# Patient Record
Sex: FEMALE | Race: BLACK OR AFRICAN AMERICAN | Employment: FULL TIME | URBAN - METROPOLITAN AREA
[De-identification: names, ages, dates, MRNs, and addresses within clinical notes are randomized per-mention and may not be internally consistent; named-entity substitution may affect disease eponyms.]

---

## 2017-01-05 ENCOUNTER — PATIENT MESSAGE (OUTPATIENT)
Dept: INTERNAL MEDICINE CLINIC | Age: 52
End: 2017-01-05

## 2017-01-10 DIAGNOSIS — M54.41 LOW BACK PAIN WITH RIGHT-SIDED SCIATICA, UNSPECIFIED BACK PAIN LATERALITY, UNSPECIFIED CHRONICITY: ICD-10-CM

## 2017-04-03 RX ORDER — SIMVASTATIN 20 MG
TABLET ORAL
Qty: 21 TABLET | Refills: 0 | Status: SHIPPED | OUTPATIENT
Start: 2017-04-03 | End: 2017-08-04 | Stop reason: SDUPTHER

## 2017-04-24 RX ORDER — SIMVASTATIN 20 MG
TABLET ORAL
Qty: 30 TABLET | Refills: 0 | Status: SHIPPED | OUTPATIENT
Start: 2017-04-24 | End: 2017-07-16 | Stop reason: SDUPTHER

## 2017-05-25 ENCOUNTER — TELEPHONE (OUTPATIENT)
Dept: INTERNAL MEDICINE CLINIC | Age: 52
End: 2017-05-25

## 2017-05-25 DIAGNOSIS — E78.5 HYPERLIPIDEMIA, UNSPECIFIED HYPERLIPIDEMIA TYPE: Primary | ICD-10-CM

## 2017-07-11 DIAGNOSIS — E78.5 HYPERLIPIDEMIA, UNSPECIFIED HYPERLIPIDEMIA TYPE: ICD-10-CM

## 2017-07-11 LAB
ALBUMIN SERPL-MCNC: 4.7 G/DL (ref 3.4–5)
ALP BLD-CCNC: 88 U/L (ref 40–129)
ALT SERPL-CCNC: 13 U/L (ref 10–40)
AST SERPL-CCNC: 19 U/L (ref 15–37)
BILIRUB SERPL-MCNC: 0.6 MG/DL (ref 0–1)
BILIRUBIN DIRECT: <0.2 MG/DL (ref 0–0.3)
BILIRUBIN, INDIRECT: NORMAL MG/DL (ref 0–1)
CHOLESTEROL, TOTAL: 265 MG/DL (ref 0–199)
HDLC SERPL-MCNC: 86 MG/DL (ref 40–60)
LDL CHOLESTEROL CALCULATED: 162 MG/DL
TOTAL PROTEIN: 7.7 G/DL (ref 6.4–8.2)
TRIGL SERPL-MCNC: 87 MG/DL (ref 0–150)
VLDLC SERPL CALC-MCNC: 17 MG/DL

## 2017-07-17 RX ORDER — SIMVASTATIN 20 MG
TABLET ORAL
Qty: 14 TABLET | Refills: 0 | Status: SHIPPED | OUTPATIENT
Start: 2017-07-17 | End: 2017-08-04 | Stop reason: SDUPTHER

## 2017-08-04 ENCOUNTER — OFFICE VISIT (OUTPATIENT)
Dept: INTERNAL MEDICINE CLINIC | Age: 52
End: 2017-08-04

## 2017-08-04 VITALS
HEART RATE: 79 BPM | OXYGEN SATURATION: 96 % | BODY MASS INDEX: 25.32 KG/M2 | TEMPERATURE: 97.9 F | SYSTOLIC BLOOD PRESSURE: 138 MMHG | WEIGHT: 137.6 LBS | HEIGHT: 62 IN | DIASTOLIC BLOOD PRESSURE: 78 MMHG | RESPIRATION RATE: 12 BRPM

## 2017-08-04 DIAGNOSIS — G47.00 INSOMNIA, UNSPECIFIED TYPE: ICD-10-CM

## 2017-08-04 DIAGNOSIS — K21.9 GASTROESOPHAGEAL REFLUX DISEASE, ESOPHAGITIS PRESENCE NOT SPECIFIED: ICD-10-CM

## 2017-08-04 DIAGNOSIS — E78.5 HYPERLIPIDEMIA, UNSPECIFIED HYPERLIPIDEMIA TYPE: Primary | ICD-10-CM

## 2017-08-04 PROCEDURE — 99214 OFFICE O/P EST MOD 30 MIN: CPT | Performed by: INTERNAL MEDICINE

## 2017-08-04 RX ORDER — CLONAZEPAM 1 MG/1
1 TABLET ORAL NIGHTLY PRN
Qty: 30 TABLET | Refills: 0 | Status: SHIPPED | OUTPATIENT
Start: 2017-08-04 | End: 2017-11-16 | Stop reason: SDUPTHER

## 2017-08-04 RX ORDER — OMEPRAZOLE 20 MG/1
20 CAPSULE, DELAYED RELEASE ORAL DAILY
Qty: 30 CAPSULE | Refills: 3 | Status: SHIPPED | OUTPATIENT
Start: 2017-08-04 | End: 2017-11-16 | Stop reason: SDUPTHER

## 2017-08-04 RX ORDER — SIMVASTATIN 20 MG
TABLET ORAL
Qty: 90 TABLET | Refills: 1 | Status: SHIPPED | OUTPATIENT
Start: 2017-08-04 | End: 2018-03-01 | Stop reason: SDUPTHER

## 2017-08-07 ASSESSMENT — ENCOUNTER SYMPTOMS
WHEEZING: 0
CHEST TIGHTNESS: 0
NAUSEA: 0
COUGH: 0
DIARRHEA: 0
ABDOMINAL PAIN: 0
BLOOD IN STOOL: 0
SINUS PRESSURE: 0
TROUBLE SWALLOWING: 0
VOMITING: 0
SHORTNESS OF BREATH: 0
CONSTIPATION: 0
RHINORRHEA: 0
SORE THROAT: 0

## 2017-10-31 ENCOUNTER — TELEPHONE (OUTPATIENT)
Dept: INTERNAL MEDICINE CLINIC | Age: 52
End: 2017-10-31

## 2017-10-31 RX ORDER — VALACYCLOVIR HYDROCHLORIDE 500 MG/1
500 TABLET, FILM COATED ORAL 2 TIMES DAILY
Qty: 10 TABLET | Refills: 0 | Status: SHIPPED | OUTPATIENT
Start: 2017-10-31 | End: 2017-12-14 | Stop reason: SDUPTHER

## 2017-10-31 NOTE — TELEPHONE ENCOUNTER
Patient asking for refill on valtrex  -any question call  [phone] --pharm number [phone] -patient leaving town tomorrow at 7.00 am --

## 2017-10-31 NOTE — TELEPHONE ENCOUNTER
Left message on pt's voicemail to verify dosage she takes since I have not filled it for her before. Pt called back and left message that she takes 500mg twice daily for 5 days. Rx refilled.

## 2017-11-16 ENCOUNTER — OFFICE VISIT (OUTPATIENT)
Dept: INTERNAL MEDICINE CLINIC | Age: 52
End: 2017-11-16

## 2017-11-16 VITALS
WEIGHT: 137.4 LBS | RESPIRATION RATE: 14 BRPM | BODY MASS INDEX: 25.94 KG/M2 | HEART RATE: 81 BPM | SYSTOLIC BLOOD PRESSURE: 124 MMHG | HEIGHT: 61 IN | OXYGEN SATURATION: 97 % | DIASTOLIC BLOOD PRESSURE: 84 MMHG | TEMPERATURE: 97.9 F

## 2017-11-16 DIAGNOSIS — E78.2 MIXED HYPERLIPIDEMIA: ICD-10-CM

## 2017-11-16 DIAGNOSIS — Z11.59 NEED FOR HEPATITIS C SCREENING TEST: ICD-10-CM

## 2017-11-16 DIAGNOSIS — K63.5 POLYP OF COLON, UNSPECIFIED PART OF COLON, UNSPECIFIED TYPE: ICD-10-CM

## 2017-11-16 DIAGNOSIS — K21.9 LARYNGOPHARYNGEAL REFLUX: ICD-10-CM

## 2017-11-16 DIAGNOSIS — Z11.4 SCREENING FOR HIV WITHOUT PRESENCE OF RISK FACTORS: ICD-10-CM

## 2017-11-16 DIAGNOSIS — Z00.00 ANNUAL PHYSICAL EXAM: ICD-10-CM

## 2017-11-16 DIAGNOSIS — R20.2 PARESTHESIAS: ICD-10-CM

## 2017-11-16 DIAGNOSIS — F51.01 PRIMARY INSOMNIA: ICD-10-CM

## 2017-11-16 DIAGNOSIS — Z00.00 ANNUAL PHYSICAL EXAM: Primary | ICD-10-CM

## 2017-11-16 DIAGNOSIS — A60.00 GENITAL HERPES SIMPLEX, UNSPECIFIED SITE: ICD-10-CM

## 2017-11-16 DIAGNOSIS — J39.2 SPASM OF THROAT: ICD-10-CM

## 2017-11-16 LAB
A/G RATIO: 1.7 (ref 1.1–2.2)
ALBUMIN SERPL-MCNC: 4.5 G/DL (ref 3.4–5)
ALP BLD-CCNC: 81 U/L (ref 40–129)
ALT SERPL-CCNC: 10 U/L (ref 10–40)
ANION GAP SERPL CALCULATED.3IONS-SCNC: 19 MMOL/L (ref 3–16)
AST SERPL-CCNC: 20 U/L (ref 15–37)
BASOPHILS ABSOLUTE: 0 K/UL (ref 0–0.2)
BASOPHILS RELATIVE PERCENT: 0.9 %
BILIRUB SERPL-MCNC: 0.8 MG/DL (ref 0–1)
BILIRUBIN, POC: NORMAL
BLOOD URINE, POC: NORMAL
BUN BLDV-MCNC: 19 MG/DL (ref 7–20)
CALCIUM SERPL-MCNC: 9.7 MG/DL (ref 8.3–10.6)
CHLORIDE BLD-SCNC: 106 MMOL/L (ref 99–110)
CHOLESTEROL, TOTAL: 253 MG/DL (ref 0–199)
CLARITY, POC: CLEAR
CO2: 22 MMOL/L (ref 21–32)
COLOR, POC: NORMAL
CREAT SERPL-MCNC: 0.9 MG/DL (ref 0.6–1.1)
EOSINOPHILS ABSOLUTE: 0.1 K/UL (ref 0–0.6)
EOSINOPHILS RELATIVE PERCENT: 1.6 %
GFR AFRICAN AMERICAN: >60
GFR NON-AFRICAN AMERICAN: >60
GLOBULIN: 2.7 G/DL
GLUCOSE BLD-MCNC: 92 MG/DL (ref 70–99)
GLUCOSE URINE, POC: NORMAL
HCT VFR BLD CALC: 36.6 % (ref 36–48)
HDLC SERPL-MCNC: 88 MG/DL (ref 40–60)
HEMOGLOBIN: 12 G/DL (ref 12–16)
HEPATITIS C ANTIBODY INTERPRETATION: NORMAL
KETONES, POC: NORMAL
LDL CHOLESTEROL CALCULATED: 147 MG/DL
LEUKOCYTE EST, POC: NORMAL
LYMPHOCYTES ABSOLUTE: 1.6 K/UL (ref 1–5.1)
LYMPHOCYTES RELATIVE PERCENT: 40.7 %
MCH RBC QN AUTO: 30.7 PG (ref 26–34)
MCHC RBC AUTO-ENTMCNC: 32.8 G/DL (ref 31–36)
MCV RBC AUTO: 93.5 FL (ref 80–100)
MONOCYTES ABSOLUTE: 0.3 K/UL (ref 0–1.3)
MONOCYTES RELATIVE PERCENT: 6.8 %
NEUTROPHILS ABSOLUTE: 2 K/UL (ref 1.7–7.7)
NEUTROPHILS RELATIVE PERCENT: 50 %
NITRITE, POC: NORMAL
PDW BLD-RTO: 13.3 % (ref 12.4–15.4)
PH, POC: 5.5
PLATELET # BLD: 213 K/UL (ref 135–450)
PMV BLD AUTO: 9.6 FL (ref 5–10.5)
POTASSIUM SERPL-SCNC: 4 MMOL/L (ref 3.5–5.1)
PROTEIN, POC: NORMAL
RBC # BLD: 3.91 M/UL (ref 4–5.2)
SODIUM BLD-SCNC: 147 MMOL/L (ref 136–145)
SPECIFIC GRAVITY, POC: 1.01
TOTAL PROTEIN: 7.2 G/DL (ref 6.4–8.2)
TRIGL SERPL-MCNC: 92 MG/DL (ref 0–150)
UROBILINOGEN, POC: 0.2
VITAMIN B-12: 652 PG/ML (ref 211–911)
VLDLC SERPL CALC-MCNC: 18 MG/DL
WBC # BLD: 4 K/UL (ref 4–11)

## 2017-11-16 PROCEDURE — 99396 PREV VISIT EST AGE 40-64: CPT | Performed by: INTERNAL MEDICINE

## 2017-11-16 PROCEDURE — 81002 URINALYSIS NONAUTO W/O SCOPE: CPT | Performed by: INTERNAL MEDICINE

## 2017-11-16 RX ORDER — OMEPRAZOLE 20 MG/1
20 CAPSULE, DELAYED RELEASE ORAL DAILY
Qty: 30 CAPSULE | Refills: 5 | Status: SHIPPED | OUTPATIENT
Start: 2017-11-16 | End: 2018-12-31 | Stop reason: ALTCHOICE

## 2017-11-16 RX ORDER — ACYCLOVIR 50 MG/G
OINTMENT TOPICAL
Qty: 15 G | Refills: 2 | Status: SHIPPED | OUTPATIENT
Start: 2017-11-16 | End: 2020-08-28

## 2017-11-16 RX ORDER — CLONAZEPAM 1 MG/1
1 TABLET ORAL NIGHTLY PRN
Qty: 30 TABLET | Refills: 1 | Status: SHIPPED | OUTPATIENT
Start: 2017-11-16 | End: 2018-03-07 | Stop reason: SDUPTHER

## 2017-11-16 ASSESSMENT — ENCOUNTER SYMPTOMS
EYE PAIN: 0
PHOTOPHOBIA: 0
SINUS PRESSURE: 0
RHINORRHEA: 0
SHORTNESS OF BREATH: 0
RECTAL PAIN: 0
COUGH: 0
ABDOMINAL DISTENTION: 0
CHOKING: 0
SORE THROAT: 0
NAUSEA: 0
CONSTIPATION: 0
WHEEZING: 0
BLOOD IN STOOL: 0
EYE DISCHARGE: 0
VOMITING: 0
BACK PAIN: 1
EYE ITCHING: 0
EYE REDNESS: 0
VOICE CHANGE: 0
ANAL BLEEDING: 0
APNEA: 0
CHEST TIGHTNESS: 0
TROUBLE SWALLOWING: 0
ABDOMINAL PAIN: 0
DIARRHEA: 0
FACIAL SWELLING: 0

## 2017-11-16 ASSESSMENT — PATIENT HEALTH QUESTIONNAIRE - PHQ9
2. FEELING DOWN, DEPRESSED OR HOPELESS: 0
1. LITTLE INTEREST OR PLEASURE IN DOING THINGS: 0
SUM OF ALL RESPONSES TO PHQ9 QUESTIONS 1 & 2: 0
SUM OF ALL RESPONSES TO PHQ QUESTIONS 1-9: 0

## 2017-11-16 NOTE — PROGRESS NOTES
Trecia Dance   YOB: 1965    Date of Visit:  11/16/2017    Chief Complaint   Patient presents with    Annual Exam     fasting        HPI  Pt presents for annual physical exam. Pap smear is scheduled for 11/20/17. LMP > 1 year ago. Pt has insomnia. Pt takes Melatonin and Klonopin. Pt has laryngeal reflux stable    Tolerating Simvastatin. Does yoga once a week helps back pain. Pt didn't see Ortho  Low back pain flare up once every 2 weeks doesn't take anything. Constant dull achy can be sharp  Rads to right leg once every 2 weeks but tolerable   Does back exercises    Numbness both feet x 1 year       Genital herpes twice a year  Spasm in throat at least once a month has to take a swallow if talking and it happens  Denies difficulty swallowing  Sometimes just comes on feels like internal pulsation  Can last off and on for 6- 12 hours      Review of Systems   Constitutional: Negative for activity change, appetite change, chills, diaphoresis, fatigue, fever and unexpected weight change. HENT: Negative for congestion, ear discharge, ear pain, facial swelling, hearing loss, mouth sores, nosebleeds, postnasal drip, rhinorrhea, sinus pressure, sneezing, sore throat, tinnitus, trouble swallowing and voice change. Eyes: Negative for photophobia, pain, discharge, redness, itching and visual disturbance. Respiratory: Negative for apnea, cough, choking, chest tightness, shortness of breath and wheezing. Cardiovascular: Negative for chest pain, palpitations and leg swelling. Gastrointestinal: Negative for abdominal distention, abdominal pain, anal bleeding, blood in stool, constipation, diarrhea, nausea, rectal pain and vomiting. Endocrine: Negative for cold intolerance and heat intolerance.    Genitourinary: Negative for decreased urine volume, difficulty urinating, dysuria, flank pain, frequency, genital sores, hematuria, menstrual problem, pelvic pain, urgency, vaginal bleeding, vaginal discharge and vaginal pain. Musculoskeletal: Positive for back pain. Negative for arthralgias, gait problem, joint swelling, myalgias, neck pain and neck stiffness. Skin: Negative for rash. Neurological: Positive for numbness. Negative for dizziness, tremors, seizures, syncope, facial asymmetry, speech difficulty, weakness, light-headedness and headaches. Hematological: Negative for adenopathy. Bruises/bleeds easily (bruises easily). Psychiatric/Behavioral: Positive for sleep disturbance. Negative for decreased concentration and dysphoric mood. The patient is not nervous/anxious. All other systems reviewed and are negative.       Past Medical History:   Diagnosis Date    Acid reflux     Allergic rhinitis     Anxiety     Glaucoma     bilateral    Hyperlipidemia     borderline     Insomnia        Past Surgical History:   Procedure Laterality Date    BREAST BIOPSY Right 03/31/2016    BENIGN    BUNIONECTOMY Bilateral 2012, 2013    COLONOSCOPY  01/30/2014    [facility] TUBAL LIGATION  2001    WISDOM TOOTH EXTRACTION Bilateral teenager    x 4       Outpatient Prescriptions Marked as Taking for the 11/16/17 encounter (Office Visit) with Zi Chavez MD   Medication Sig Dispense Refill    simvastatin (ZOCOR) 20 MG tablet TAKE 1 TABLET BY MOUTH EVERY EVENING 90 tablet 1    clonazePAM (KLONOPIN) 1 MG tablet Take 1 tablet by mouth nightly as needed (insomnia) 30 tablet 0    omeprazole (PRILOSEC) 20 MG delayed release capsule Take 1 capsule by mouth daily 30 capsule 3    latanoprost (XALATAN) 0.005 % ophthalmic solution Place 1 drop into both eyes nightly   0    Multiple Vitamins-Minerals (THERAPEUTIC MULTIVITAMIN-MINERALS) tablet Take 1 tablet by mouth daily      Biotin 5000 MCG CAPS Take 1 capsule by mouth daily      Cyanocobalamin (VITAMIN B 12 PO) Take 1 tablet by mouth daily      Melatonin 2.5 MG CAPS Take 1 capsule by mouth nightly as needed      are normal. She exhibits no distension and no mass. There is no hepatosplenomegaly. There is no tenderness. There is no rebound. Genitourinary:   Genitourinary Comments: Deferred   Musculoskeletal: Normal range of motion. She exhibits no edema. Right shoulder: She exhibits normal range of motion and no tenderness. Left shoulder: She exhibits normal range of motion and no tenderness. Right elbow: She exhibits no swelling. No tenderness found. Left elbow: She exhibits no swelling. No tenderness found. Right wrist: She exhibits no tenderness and no swelling. Left wrist: She exhibits no tenderness and no swelling. Right hip: She exhibits no tenderness. Left hip: She exhibits no tenderness. Right knee: She exhibits no swelling. No tenderness found. Left knee: She exhibits no swelling. No tenderness found. Right ankle: She exhibits no swelling. No tenderness. Left ankle: She exhibits no swelling. No tenderness. Cervical back: She exhibits normal range of motion, no tenderness and no spasm. Thoracic back: She exhibits no tenderness and no spasm. Lumbar back: She exhibits normal range of motion, no tenderness and no spasm. Right upper arm: She exhibits no tenderness. Left upper arm: She exhibits no tenderness. Right hand: She exhibits no tenderness and no swelling. Left hand: She exhibits no tenderness and no swelling. Right upper leg: She exhibits no tenderness. Left upper leg: She exhibits no tenderness. Right lower leg: She exhibits no tenderness. Left lower leg: She exhibits no tenderness. Right foot: There is no tenderness and no swelling. Left foot: There is no tenderness and no swelling. Lymphadenopathy:        Head (right side): No submandibular adenopathy present. Head (left side): No submandibular adenopathy present.      She has no cervical adenopathy. She has no axillary adenopathy. Neurological: She is alert and oriented to person, place, and time. She has normal strength and normal reflexes. No cranial nerve deficit. Gait normal.   Skin: Skin is warm, dry and intact. No bruising, no lesion and no rash noted. No erythema. Psychiatric: She has a normal mood and affect. Her speech is normal.       Results for POC orders placed in visit on 11/16/17   POCT Urinalysis no Micro   Result Value Ref Range    Color, UA pale     Clarity, UA clear     Glucose, UA POC neg     Bilirubin, UA neg     Ketones, UA neg     Spec Grav, UA 1.015     Blood, UA POC trace-intact     pH, UA 5.5     Protein, UA POC neg     Urobilinogen, UA 0.2     Leukocytes, UA neg     Nitrite, UA neg        Assessment/Plan     1. Annual physical exam    - POCT Urinalysis no Micro  - CBC Auto Differential; Future  - Comprehensive Metabolic Panel; Future  - Lipid Panel; Future  - TSH without Reflex; Future    2. Primary insomnia  -stable  - clonazePAM (KLONOPIN) 1 MG tablet; Take 1 tablet by mouth nightly as needed (insomnia) . Dispense: 30 tablet; Refill: 1    3. Mixed hyperlipidemia  -Continue same medications  -Low fat, low cholesterol diet  -Regular aerobic exercise    4. Laryngopharyngeal reflux  - omeprazole (PRILOSEC) 20 MG delayed release capsule; Take 1 capsule by mouth daily  Dispense: 30 capsule; Refill: 5    5. Polyp of colon, unspecified part of colon, unspecified type    - [facility] - Ashok Desai MD ([facility])    6. Genital herpes simplex, unspecified site    - acyclovir (ZOVIRAX) 5 % ointment; Apply topically every 3 hours for 7 days with outbreaks  Dispense: 15 g; Refill: 2    7. Paresthesias    - EMG; Future  - Vitamin B12; Future    8. Screening for HIV without presence of risk factors    - HIV Screen; Future    9. Need for hepatitis C screening test    - Hepatitis C Antibody; Future    10.  Spasm of throat    - [place],

## 2017-11-16 NOTE — PATIENT INSTRUCTIONS
early.  · Cholesterol. Your doctor will tell you how often to have this done based on your age, family history, or other things that can increase your risk for heart attack and stroke. · Blood pressure. Have your blood pressure checked during a routine doctor visit. Your doctor will tell you how often to check your blood pressure based on your age, your blood pressure results, and other factors. · Mammogram. Ask your doctor how often you should have a mammogram, which is an X-ray of your breasts. A mammogram can spot breast cancer before it can be felt and when it is easiest to treat. · Pap test and pelvic exam. Ask your doctor how often you should have a Pap test. You may not need to have a Pap test as often as you used to. · Vision. Have your eyes checked every year or two or as often as your doctor suggests. Some experts recommend that you have yearly exams for glaucoma and other age-related eye problems starting at age 48. · Hearing. Tell your doctor if you notice any change in your hearing. You can have tests to find out how well you hear. · Diabetes. Ask your doctor whether you should have tests for diabetes. · Colon cancer. You should begin tests for colon cancer at age 48. You may have one of several tests. Your doctor will tell you how often to have tests based on your age and risk. Risks include whether you already had a precancerous polyp removed from your colon or whether your parents, sisters and brothers, or children have had colon cancer. · Thyroid disease. Talk to your doctor about whether to have your thyroid checked as part of a regular physical exam. Women have an increased chance of a thyroid problem. · Osteoporosis. You should begin tests for bone density at age 72. If you are younger than 72, ask your doctor whether you have factors that may increase your risk for this disease. You may want to have this test before age 72. · Heart attack and stroke risk.  At least every 4 to 6 years, you should have your risk for heart attack and stroke assessed. Your doctor uses factors such as your age, blood pressure, cholesterol, and whether you smoke or have diabetes to show what your risk for a heart attack or stroke is over the next 10 years. When should you call for help? Watch closely for changes in your health, and be sure to contact your doctor if you have any problems or symptoms that concern you. Where can you learn more? Go to https://[employer]. org and sign in to your [employer] account. Enter U002 in the [employer] box to learn more about \"Well Visit, Women 50 to 72: Care Instructions. \"     If you do not have an account, please click on the \"Sign Up Now\" link. Current as of: July 19, 2016  Content Version: 11.3  © 2880-2342 [employer]. Care instructions adapted under license by [facility] ([facility]). If you have questions about a medical condition or this instruction, always ask your healthcare professional. [place] [address] any warranty or liability for your use of this information.

## 2017-11-17 LAB
HIV-1 AND HIV-2 ANTIBODIES: NORMAL
TSH SERPL DL<=0.05 MIU/L-ACNC: 1.46 UIU/ML (ref 0.27–4.2)

## 2017-11-20 ENCOUNTER — PATIENT MESSAGE (OUTPATIENT)
Dept: INTERNAL MEDICINE CLINIC | Age: 52
End: 2017-11-20

## 2017-11-21 NOTE — TELEPHONE ENCOUNTER
From: Philippe Rider  To: Steven Buck MD  Sent: 11/20/2017 8:23 PM EST  Subject: Test Results Question    Hello,    Thank you for results. I did not see the HIV results. Also, I did not see the Metabolic Test results. I was at the high end of most , sodium, glucose in 2016. Wondering what my #s are now. Thank you.   Marcelina

## 2017-11-22 DIAGNOSIS — E87.29 METABOLIC ACIDOSIS, INCREASED ANION GAP: ICD-10-CM

## 2017-11-22 DIAGNOSIS — E87.0 SERUM SODIUM ELEVATED: Primary | ICD-10-CM

## 2017-12-06 ENCOUNTER — TELEPHONE (OUTPATIENT)
Dept: INTERNAL MEDICINE CLINIC | Age: 52
End: 2017-12-06

## 2017-12-06 NOTE — TELEPHONE ENCOUNTER
Received notice that patient hasn't read my [employer] message. Print message regarding results and mail to patient.

## 2017-12-15 RX ORDER — VALACYCLOVIR HYDROCHLORIDE 500 MG/1
TABLET, FILM COATED ORAL
Qty: 10 TABLET | Refills: 3 | Status: SHIPPED | OUTPATIENT
Start: 2017-12-15 | End: 2019-04-02 | Stop reason: SDUPTHER

## 2018-02-27 ENCOUNTER — OFFICE VISIT (OUTPATIENT)
Dept: INTERNAL MEDICINE CLINIC | Age: 53
End: 2018-02-27

## 2018-02-27 VITALS
SYSTOLIC BLOOD PRESSURE: 152 MMHG | HEART RATE: 76 BPM | HEIGHT: 61 IN | OXYGEN SATURATION: 98 % | DIASTOLIC BLOOD PRESSURE: 100 MMHG | BODY MASS INDEX: 26.24 KG/M2 | WEIGHT: 139 LBS

## 2018-02-27 DIAGNOSIS — F51.01 PRIMARY INSOMNIA: ICD-10-CM

## 2018-02-27 DIAGNOSIS — R42 LIGHTHEADEDNESS: ICD-10-CM

## 2018-02-27 DIAGNOSIS — Z13.6 SCREENING FOR ISCHEMIC HEART DISEASE (IHD): ICD-10-CM

## 2018-02-27 DIAGNOSIS — I10 HYPERTENSION, UNSPECIFIED TYPE: ICD-10-CM

## 2018-02-27 DIAGNOSIS — R55 SYNCOPE, UNSPECIFIED SYNCOPE TYPE: ICD-10-CM

## 2018-02-27 DIAGNOSIS — E78.2 MIXED HYPERLIPIDEMIA: ICD-10-CM

## 2018-02-27 DIAGNOSIS — I10 HYPERTENSION, UNSPECIFIED TYPE: Primary | ICD-10-CM

## 2018-02-27 LAB
A/G RATIO: 1.6 (ref 1.1–2.2)
ALBUMIN SERPL-MCNC: 4.5 G/DL (ref 3.4–5)
ALP BLD-CCNC: 86 U/L (ref 40–129)
ALT SERPL-CCNC: 12 U/L (ref 10–40)
ANION GAP SERPL CALCULATED.3IONS-SCNC: 15 MMOL/L (ref 3–16)
AST SERPL-CCNC: 20 U/L (ref 15–37)
BILIRUB SERPL-MCNC: 0.5 MG/DL (ref 0–1)
BUN BLDV-MCNC: 13 MG/DL (ref 7–20)
CALCIUM SERPL-MCNC: 9.3 MG/DL (ref 8.3–10.6)
CHLORIDE BLD-SCNC: 107 MMOL/L (ref 99–110)
CHOLESTEROL, TOTAL: 231 MG/DL (ref 0–199)
CO2: 23 MMOL/L (ref 21–32)
CREAT SERPL-MCNC: 0.6 MG/DL (ref 0.6–1.1)
GFR AFRICAN AMERICAN: >60
GFR NON-AFRICAN AMERICAN: >60
GLOBULIN: 2.8 G/DL
GLUCOSE BLD-MCNC: 109 MG/DL (ref 70–99)
HCT VFR BLD CALC: 35.4 % (ref 36–48)
HDLC SERPL-MCNC: 69 MG/DL (ref 40–60)
HEMOGLOBIN: 11.6 G/DL (ref 12–16)
LDL CHOLESTEROL CALCULATED: 132 MG/DL
MCH RBC QN AUTO: 30.6 PG (ref 26–34)
MCHC RBC AUTO-ENTMCNC: 32.8 G/DL (ref 31–36)
MCV RBC AUTO: 93.3 FL (ref 80–100)
PDW BLD-RTO: 12.9 % (ref 12.4–15.4)
PLATELET # BLD: 286 K/UL (ref 135–450)
PMV BLD AUTO: 9.1 FL (ref 5–10.5)
POTASSIUM SERPL-SCNC: 4 MMOL/L (ref 3.5–5.1)
RBC # BLD: 3.79 M/UL (ref 4–5.2)
SODIUM BLD-SCNC: 145 MMOL/L (ref 136–145)
TOTAL PROTEIN: 7.3 G/DL (ref 6.4–8.2)
TRIGL SERPL-MCNC: 148 MG/DL (ref 0–150)
TSH REFLEX FT4: 1.63 UIU/ML (ref 0.27–4.2)
VLDLC SERPL CALC-MCNC: 30 MG/DL
WBC # BLD: 4.8 K/UL (ref 4–11)

## 2018-02-27 PROCEDURE — 93000 ELECTROCARDIOGRAM COMPLETE: CPT | Performed by: NURSE PRACTITIONER

## 2018-02-27 PROCEDURE — 99214 OFFICE O/P EST MOD 30 MIN: CPT | Performed by: NURSE PRACTITIONER

## 2018-02-27 RX ORDER — AMLODIPINE BESYLATE AND BENAZEPRIL HYDROCHLORIDE 2.5; 1 MG/1; MG/1
1 CAPSULE ORAL DAILY
Qty: 30 CAPSULE | Refills: 1 | Status: SHIPPED | OUTPATIENT
Start: 2018-02-27 | End: 2018-03-27 | Stop reason: SDUPTHER

## 2018-02-27 ASSESSMENT — ENCOUNTER SYMPTOMS
CHEST TIGHTNESS: 0
SHORTNESS OF BREATH: 0

## 2018-02-27 ASSESSMENT — PATIENT HEALTH QUESTIONNAIRE - PHQ9
2. FEELING DOWN, DEPRESSED OR HOPELESS: 0
SUM OF ALL RESPONSES TO PHQ QUESTIONS 1-9: 0
1. LITTLE INTEREST OR PLEASURE IN DOING THINGS: 0
SUM OF ALL RESPONSES TO PHQ9 QUESTIONS 1 & 2: 0

## 2018-02-27 NOTE — PROGRESS NOTES
respiratory distress. Abdominal: Soft. Bowel sounds are normal.   Musculoskeletal: She exhibits no edema. Neurological: She is alert and oriented to person, place, and time. She has normal strength. No cranial nerve deficit or sensory deficit. Reflex Scores:       Tricep reflexes are 2+ on the right side and 2+ on the left side. Bicep reflexes are 2+ on the right side and 2+ on the left side. Brachioradialis reflexes are 2+ on the right side and 2+ on the left side. Patellar reflexes are 2+ on the right side and 2+ on the left side. Achilles reflexes are 2+ on the right side and 2+ on the left side. Skin: Skin is warm and dry. Psychiatric: She has a normal mood and affect. Her speech is normal and behavior is normal. Judgment and thought content normal. Cognition and memory are normal.   Nursing note and vitals reviewed. Assessment/Plan     EKG obtained and reviewed. Short OK once again noted. No ischemia. RSR V2 lead. 1. Hypertension, unspecified type  Likely benign HTN, new onset. Start Lotrel low dose. Home BP monitoring. Please remember to keep track of your blood pressure. Ideally, the top number (systolic) should be under 130 and the bottom number (diastolic) should be under 80. If the top is consistently over 140 or the bottom over 90, we may need to adjust your medication. Watch the salt in your diet -- too much can make blood pressure worse. We will lower this gradually. Avoid exerting yourself if you are experiencing lightheadedness or dizziness. Follow up with Cardiology and then Dr. Latisha Kraft MD  - amLODIPine-benazepril (LOTREL) 2.5-10 MG per capsule; Take 1 capsule by mouth daily  Dispense: 30 capsule; Refill: 1  - TSH WITH REFLEX TO FT4; Future  - Comprehensive Metabolic Panel; Future    2. Syncope, unspecified syncope type    - EKG 12 Lead  - Rena Rasmussen MD  - CBC;  Future  - TSH WITH REFLEX TO FT4; Future  - Comprehensive Metabolic Panel; Future    3. Lightheadedness    - EKG 12 Lead  - Rena Blue MD  - CBC; Future  - TSH WITH REFLEX TO FT4; Future  - Comprehensive Metabolic Panel; Future    4. Screening for ischemic heart disease (IHD)    - EKG 12 Lead    5. Mixed hyperlipidemia  Was not at goal taking medication sporadically. Will check lipids on med as prescribed. - Rod Abarca MD  - Lipid Panel; Future  - Comprehensive Metabolic Panel; Future    6. Primary insomnia  Stable  Dr. Zhen Gann will RF Klonopin. Discussed medications with patient, who voiced understanding of their use and indications. All questions answered. Pt is advised to call if symptoms worsen or do not improve.

## 2018-03-01 DIAGNOSIS — E78.5 HYPERLIPIDEMIA, UNSPECIFIED HYPERLIPIDEMIA TYPE: ICD-10-CM

## 2018-03-01 RX ORDER — SIMVASTATIN 20 MG
TABLET ORAL
Qty: 90 TABLET | Refills: 0 | Status: SHIPPED | OUTPATIENT
Start: 2018-03-01 | End: 2018-03-07

## 2018-03-02 DIAGNOSIS — G47.00 INSOMNIA, UNSPECIFIED TYPE: ICD-10-CM

## 2018-03-02 RX ORDER — SIMVASTATIN 20 MG
TABLET ORAL
Qty: 14 TABLET | Refills: 0 | OUTPATIENT
Start: 2018-03-02

## 2018-03-02 RX ORDER — SIMVASTATIN 20 MG
TABLET ORAL
Qty: 21 TABLET | Refills: 0 | OUTPATIENT
Start: 2018-03-02

## 2018-03-02 RX ORDER — CLONAZEPAM 1 MG/1
TABLET ORAL
Qty: 30 TABLET | Refills: 1 | Status: SHIPPED | OUTPATIENT
Start: 2018-03-02 | End: 2018-07-20 | Stop reason: SDUPTHER

## 2018-03-07 ENCOUNTER — OFFICE VISIT (OUTPATIENT)
Dept: CARDIOLOGY CLINIC | Age: 53
End: 2018-03-07

## 2018-03-07 VITALS
DIASTOLIC BLOOD PRESSURE: 82 MMHG | WEIGHT: 136.2 LBS | HEART RATE: 76 BPM | SYSTOLIC BLOOD PRESSURE: 110 MMHG | BODY MASS INDEX: 25.73 KG/M2

## 2018-03-07 DIAGNOSIS — R07.89 ATYPICAL CHEST PAIN: ICD-10-CM

## 2018-03-07 DIAGNOSIS — R06.02 SHORTNESS OF BREATH: Primary | ICD-10-CM

## 2018-03-07 DIAGNOSIS — E78.00 PURE HYPERCHOLESTEROLEMIA: ICD-10-CM

## 2018-03-07 PROCEDURE — 99214 OFFICE O/P EST MOD 30 MIN: CPT | Performed by: INTERNAL MEDICINE

## 2018-03-07 RX ORDER — WARFARIN SODIUM 5 MG/1
5 TABLET ORAL
COMMUNITY
End: 2018-03-07 | Stop reason: CLARIF

## 2018-03-07 NOTE — PROGRESS NOTES
Cruz Wilkins, NP   valACYclovir (VALTREX) 500 MG tablet TAKE 1 TABLET BY MOUTH TWICE DAILY FOR 5 DAYS Yes You Meng MD   acyclovir (ZOVIRAX) 5 % ointment Apply topically every 3 hours for 7 days with outbreaks Yes You Meng MD   omeprazole (PRILOSEC) 20 MG delayed release capsule Take 1 capsule by mouth daily Yes You Meng MD   latanoprost (XALATAN) 0.005 % ophthalmic solution Place 1 drop into both eyes nightly  Yes Historical Provider, MD   Multiple Vitamins-Minerals (THERAPEUTIC MULTIVITAMIN-MINERALS) tablet Take 1 tablet by mouth daily Yes Historical Provider, MD   Biotin 5000 MCG CAPS Take 1 capsule by mouth daily Yes Historical Provider, MD   Cyanocobalamin (VITAMIN B 12 PO) Take 1 tablet by mouth daily Yes Historical Provider, MD   Melatonin 2.5 MG CAPS Take 1 capsule by mouth nightly as needed Yes Historical Provider, MD     Family History  Family History   Problem Relation Age of Onset    Diabetes Mother     High Blood Pressure Mother     Cancer Mother      endometrial    Heart Disease Maternal Grandmother     High Blood Pressure Maternal Grandmother     Stroke Maternal Grandmother     Cancer Maternal Grandfather      throat       Current Medications  Current Outpatient Prescriptions   Medication Sig Dispense Refill    warfarin (COUMADIN) 5 MG tablet Take 5 mg by mouth Managed by Dr Lorena Shook Cornelia      clonazePAM (KLONOPIN) 1 MG tablet TAKE 1 TABLET BY MOUTH EVERY NIGHT AS NEEDED FOR INSOMNIA 30 tablet 1    simvastatin (ZOCOR) 20 MG tablet TAKE 1 TABLET BY MOUTH EVERY EVENING 90 tablet 0    amLODIPine-benazepril (LOTREL) 2.5-10 MG per capsule Take 1 capsule by mouth daily 30 capsule 1    valACYclovir (VALTREX) 500 MG tablet TAKE 1 TABLET BY MOUTH TWICE DAILY FOR 5 DAYS 10 tablet 3    acyclovir (ZOVIRAX) 5 % ointment Apply topically every 3 hours for 7 days with outbreaks 15 g 2    omeprazole (PRILOSEC) 20 MG delayed release capsule Take 1 capsule by mouth daily 30 capsule 5  latanoprost (XALATAN) 0.005 % ophthalmic solution Place 1 drop into both eyes nightly   0    Multiple Vitamins-Minerals (THERAPEUTIC MULTIVITAMIN-MINERALS) tablet Take 1 tablet by mouth daily      Biotin 5000 MCG CAPS Take 1 capsule by mouth daily      Cyanocobalamin (VITAMIN B 12 PO) Take 1 tablet by mouth daily      Melatonin 2.5 MG CAPS Take 1 capsule by mouth nightly as needed       No current facility-administered medications for this visit. REVIEW OF SYSTEMS:    CONSTITUTIONAL: No major weight gain or loss, fatigue, weakness, night sweats or fever. HEENT: No new vision difficulties or ringing in the ears. RESPIRATORY: No new SOB, PND, orthopnea or cough. CARDIOVASCULAR: See HPI  GI: No nausea, vomiting, diarrhea, constipation, abdominal pain or changes in bowel habits. : No urinary frequency, urgency, incontinence hematuria or dysuria. SKIN: No cyanosis or skin lesions. MUSCULOSKELETAL: No new muscle or joint pain. NEUROLOGICAL: No syncope or TIA-like symptoms. PSYCHIATRIC: No anxiety, pain, insomnia or depression    Objective:   PHYSICAL EXAM:        VITALS:    Wt Readings from Last 3 Encounters:   03/07/18 136 lb 3.2 oz (61.8 kg)   02/27/18 139 lb (63 kg)   11/16/17 137 lb 6.4 oz (62.3 kg)     BP Readings from Last 3 Encounters:   03/07/18 110/82   02/27/18 (!) 152/100   11/16/17 124/84     Pulse Readings from Last 3 Encounters:   03/07/18 76   02/27/18 76   11/16/17 81       CONSTITUTIONAL: Cooperative, no apparent distress, and appears well nourished / developed  NEUROLOGIC:  Awake and orientated to person, place and time. PSYCH: Calm affect. SKIN: Warm and dry. HEENT: Sclera non-icteric, normocephalic, neck supple, no elevation of JVP, normal carotid pulses with no bruits and thyroid normal size.   LUNGS:  No increased work of breathing and clear to auscultation, no crackles or wheezing  CARDIOVASCULAR:  Regular rate and rhythm with no murmurs, gallops, rubs, or abnormal heart

## 2018-03-08 RX ORDER — ATORVASTATIN CALCIUM 40 MG/1
40 TABLET, FILM COATED ORAL DAILY
Qty: 30 TABLET | Refills: 3 | Status: SHIPPED | OUTPATIENT
Start: 2018-03-08 | End: 2018-07-21 | Stop reason: SDUPTHER

## 2018-03-27 DIAGNOSIS — I10 HYPERTENSION, UNSPECIFIED TYPE: ICD-10-CM

## 2018-03-27 RX ORDER — AMLODIPINE BESYLATE AND BENAZEPRIL HYDROCHLORIDE 2.5; 1 MG/1; MG/1
1 CAPSULE ORAL DAILY
Qty: 30 CAPSULE | Refills: 1 | Status: SHIPPED | OUTPATIENT
Start: 2018-03-27 | End: 2018-05-22 | Stop reason: SDUPTHER

## 2018-04-12 PROBLEM — Z00.00 ANNUAL PHYSICAL EXAM: Status: RESOLVED | Noted: 2017-11-16 | Resolved: 2018-04-12

## 2018-04-22 DIAGNOSIS — F51.01 PRIMARY INSOMNIA: ICD-10-CM

## 2018-04-24 ENCOUNTER — OFFICE VISIT (OUTPATIENT)
Dept: INTERNAL MEDICINE CLINIC | Age: 53
End: 2018-04-24

## 2018-04-24 VITALS
WEIGHT: 134.6 LBS | OXYGEN SATURATION: 98 % | SYSTOLIC BLOOD PRESSURE: 124 MMHG | BODY MASS INDEX: 25.43 KG/M2 | HEART RATE: 73 BPM | DIASTOLIC BLOOD PRESSURE: 68 MMHG

## 2018-04-24 DIAGNOSIS — B35.1 ONYCHOMYCOSIS: ICD-10-CM

## 2018-04-24 DIAGNOSIS — I10 ESSENTIAL HYPERTENSION: Primary | ICD-10-CM

## 2018-04-24 DIAGNOSIS — F51.01 PRIMARY INSOMNIA: ICD-10-CM

## 2018-04-24 DIAGNOSIS — E78.00 PURE HYPERCHOLESTEROLEMIA: ICD-10-CM

## 2018-04-24 DIAGNOSIS — E78.2 MIXED HYPERLIPIDEMIA: ICD-10-CM

## 2018-04-24 DIAGNOSIS — R06.02 SHORTNESS OF BREATH: ICD-10-CM

## 2018-04-24 DIAGNOSIS — L72.3 SEBACEOUS CYST: ICD-10-CM

## 2018-04-24 DIAGNOSIS — M79.675 PAIN OF LEFT GREAT TOE: ICD-10-CM

## 2018-04-24 DIAGNOSIS — R07.89 ATYPICAL CHEST PAIN: ICD-10-CM

## 2018-04-24 LAB
A/G RATIO: 2.1 (ref 1.1–2.2)
ALBUMIN SERPL-MCNC: 4.7 G/DL (ref 3.4–5)
ALP BLD-CCNC: 80 U/L (ref 40–129)
ALT SERPL-CCNC: 11 U/L (ref 10–40)
ANION GAP SERPL CALCULATED.3IONS-SCNC: 15 MMOL/L (ref 3–16)
AST SERPL-CCNC: 19 U/L (ref 15–37)
BILIRUB SERPL-MCNC: 0.7 MG/DL (ref 0–1)
BUN BLDV-MCNC: 14 MG/DL (ref 7–20)
CALCIUM SERPL-MCNC: 9.4 MG/DL (ref 8.3–10.6)
CHLORIDE BLD-SCNC: 104 MMOL/L (ref 99–110)
CHOLESTEROL, TOTAL: 177 MG/DL (ref 0–199)
CO2: 25 MMOL/L (ref 21–32)
CREAT SERPL-MCNC: 0.7 MG/DL (ref 0.6–1.1)
GFR AFRICAN AMERICAN: >60
GFR NON-AFRICAN AMERICAN: >60
GLOBULIN: 2.2 G/DL
GLUCOSE BLD-MCNC: 95 MG/DL (ref 70–99)
HDLC SERPL-MCNC: 80 MG/DL (ref 40–60)
LDL CHOLESTEROL CALCULATED: 83 MG/DL
PHOSPHORUS: 3.6 MG/DL (ref 2.5–4.9)
POTASSIUM SERPL-SCNC: 4 MMOL/L (ref 3.5–5.1)
SODIUM BLD-SCNC: 144 MMOL/L (ref 136–145)
TOTAL PROTEIN: 6.9 G/DL (ref 6.4–8.2)
TRIGL SERPL-MCNC: 72 MG/DL (ref 0–150)
VLDLC SERPL CALC-MCNC: 14 MG/DL

## 2018-04-24 PROCEDURE — 99214 OFFICE O/P EST MOD 30 MIN: CPT | Performed by: INTERNAL MEDICINE

## 2018-04-24 RX ORDER — CLONAZEPAM 1 MG/1
TABLET ORAL
Qty: 30 TABLET | Refills: 0 | OUTPATIENT
Start: 2018-04-24

## 2018-04-26 ENCOUNTER — TELEPHONE (OUTPATIENT)
Dept: INTERNAL MEDICINE CLINIC | Age: 53
End: 2018-04-26

## 2018-04-26 ASSESSMENT — ENCOUNTER SYMPTOMS
WHEEZING: 0
COUGH: 0
CHEST TIGHTNESS: 0
VOMITING: 0
CONSTIPATION: 0
ABDOMINAL PAIN: 0
SHORTNESS OF BREATH: 0
BLOOD IN STOOL: 0
RHINORRHEA: 0
NAUSEA: 0
SINUS PRESSURE: 0
DIARRHEA: 0
SORE THROAT: 0

## 2018-05-22 DIAGNOSIS — I10 HYPERTENSION, UNSPECIFIED TYPE: ICD-10-CM

## 2018-05-22 RX ORDER — AMLODIPINE BESYLATE AND BENAZEPRIL HYDROCHLORIDE 2.5; 1 MG/1; MG/1
1 CAPSULE ORAL DAILY
Qty: 30 CAPSULE | Refills: 5 | Status: SHIPPED | OUTPATIENT
Start: 2018-05-22 | End: 2018-12-31

## 2018-06-01 DIAGNOSIS — E78.5 HYPERLIPIDEMIA, UNSPECIFIED HYPERLIPIDEMIA TYPE: ICD-10-CM

## 2018-06-01 RX ORDER — SIMVASTATIN 20 MG
TABLET ORAL
Qty: 90 TABLET | Refills: 0 | OUTPATIENT
Start: 2018-06-01

## 2018-07-20 ENCOUNTER — OFFICE VISIT (OUTPATIENT)
Dept: INTERNAL MEDICINE CLINIC | Age: 53
End: 2018-07-20

## 2018-07-20 VITALS
HEIGHT: 62 IN | SYSTOLIC BLOOD PRESSURE: 118 MMHG | DIASTOLIC BLOOD PRESSURE: 84 MMHG | WEIGHT: 135.4 LBS | OXYGEN SATURATION: 95 % | BODY MASS INDEX: 24.92 KG/M2 | HEART RATE: 80 BPM

## 2018-07-20 DIAGNOSIS — R53.83 FATIGUE, UNSPECIFIED TYPE: ICD-10-CM

## 2018-07-20 DIAGNOSIS — K63.5 POLYP OF COLON, UNSPECIFIED PART OF COLON, UNSPECIFIED TYPE: ICD-10-CM

## 2018-07-20 DIAGNOSIS — I10 ESSENTIAL HYPERTENSION: Primary | ICD-10-CM

## 2018-07-20 DIAGNOSIS — F51.01 PRIMARY INSOMNIA: ICD-10-CM

## 2018-07-20 DIAGNOSIS — F41.9 ANXIETY: ICD-10-CM

## 2018-07-20 DIAGNOSIS — L85.3 DRY SKIN: ICD-10-CM

## 2018-07-20 DIAGNOSIS — K21.9 GASTROESOPHAGEAL REFLUX DISEASE, ESOPHAGITIS PRESENCE NOT SPECIFIED: ICD-10-CM

## 2018-07-20 DIAGNOSIS — L98.9 CRACKING SKIN: ICD-10-CM

## 2018-07-20 DIAGNOSIS — R20.2 PARESTHESIAS: ICD-10-CM

## 2018-07-20 DIAGNOSIS — E78.2 MIXED HYPERLIPIDEMIA: ICD-10-CM

## 2018-07-20 DIAGNOSIS — R42 LIGHTHEADEDNESS: ICD-10-CM

## 2018-07-20 PROCEDURE — 99214 OFFICE O/P EST MOD 30 MIN: CPT | Performed by: INTERNAL MEDICINE

## 2018-07-20 RX ORDER — AMLODIPINE BESYLATE 2.5 MG/1
2.5 TABLET ORAL DAILY
Qty: 30 TABLET | Refills: 3 | Status: SHIPPED | OUTPATIENT
Start: 2018-07-20 | End: 2018-12-31

## 2018-07-20 RX ORDER — CLONAZEPAM 1 MG/1
TABLET ORAL
Qty: 30 TABLET | Refills: 2 | Status: SHIPPED | OUTPATIENT
Start: 2018-07-20 | End: 2019-04-04 | Stop reason: SDUPTHER

## 2018-07-20 ASSESSMENT — ENCOUNTER SYMPTOMS
BLOOD IN STOOL: 0
SINUS PRESSURE: 0
DIARRHEA: 0
CONSTIPATION: 0
COUGH: 0
ABDOMINAL PAIN: 0
CHEST TIGHTNESS: 0
RHINORRHEA: 0
VOMITING: 0
SHORTNESS OF BREATH: 0
SORE THROAT: 0
NAUSEA: 0
WHEEZING: 0

## 2018-07-20 NOTE — PROGRESS NOTES
Rama Cruz   YOB: 1965    Date of Visit:  7/20/2018    Chief Complaint   Patient presents with    Hypertension    Insomnia    Hyperlipidemia    Anxiety    Fatigue    Dizziness    Numbness       HPI  Hypertension- Pt is taking Amlodipine- Benazepril. Pt states she has fatigue, lightheadedness and dizziness with blood pressure medication. Pt states she would like to wean off the BP medication. Pt states she had a stressful work assignment when her BP increased this past winter and she was put on medication. Fatigue x 1.5 months    Insomnia-Pt takes Klonopin 1mg 1/2-1 tablet nightly as needed. Pt states she has anxiety. Pt states Klonopin helps relax her. Pt denies panic attacks. Pt states she doesn't feel extremely nervous. Pt states she gets anxiety with situations. Pt does yoga and a breathing technique for anxiety. Hyperlipidemia-Pt takes Atorvastatin 40mg nightly. Pt denies side effects. Pt decreases fat and cholesterol in her diet. Skin cracking left great toe. Both feet are dry. Pt c/o foot numbness bilateral toes and foot cramps bilateral since last visit. Pt never had EMG done ordered 11/16/17. Pt states she hasn't scheduled with Podiatry yet. GERD -flared up. Pt states she stopped Omeprazole because it wasn't refilled by the doctor who prescribed it and she read something on line that states you shouldn't be taking it. Pt states she has taken Famotidine or Tums on occasion. Pt states she has heartburn and reflux. Pt denies nausea, vomiting, and abdominal pain. Pt states she is due for a colonoscopy for follow up colon polyps. Review of Systems   Constitutional: Negative for activity change, chills, fatigue and fever. HENT: Negative for congestion, postnasal drip, rhinorrhea, sinus pressure and sore throat. Eyes: Negative for visual disturbance. Respiratory: Negative for cough, chest tightness, shortness of breath and wheezing.     Cardiovascular: Negative for chest pain, palpitations and leg swelling. Gastrointestinal: Negative for abdominal pain, blood in stool, constipation, diarrhea, nausea and vomiting. Genitourinary: Negative for dysuria, frequency and hematuria. Musculoskeletal: Negative for arthralgias and myalgias. Skin: Negative for rash. Neurological: Negative for dizziness, tremors, syncope, weakness, light-headedness, numbness and headaches. Psychiatric/Behavioral: Negative for decreased concentration, dysphoric mood and sleep disturbance. The patient is not nervous/anxious. No Known Allergies  Outpatient Prescriptions Marked as Taking for the 7/20/18 encounter (Office Visit) with Rosangela Gotti MD   Medication Sig Dispense Refill    amLODIPine (NORVASC) 2.5 MG tablet Take 1 tablet by mouth daily 30 tablet 3    clonazePAM (KLONOPIN) 1 MG tablet Take 1/2- 1 tablet nightly as needed for insomnia and 1/2 tablet once daily as needed for anxiety.  30 tablet 2    amLODIPine-benazepril (LOTREL) 2.5-10 MG per capsule TAKE 1 CAPSULE BY MOUTH DAILY 30 capsule 5    [DISCONTINUED] atorvastatin (LIPITOR) 40 MG tablet Take 1 tablet by mouth daily 30 tablet 3    valACYclovir (VALTREX) 500 MG tablet TAKE 1 TABLET BY MOUTH TWICE DAILY FOR 5 DAYS 10 tablet 3    acyclovir (ZOVIRAX) 5 % ointment Apply topically every 3 hours for 7 days with outbreaks 15 g 2    omeprazole (PRILOSEC) 20 MG delayed release capsule Take 1 capsule by mouth daily 30 capsule 5    latanoprost (XALATAN) 0.005 % ophthalmic solution Place 1 drop into both eyes nightly   0    Multiple Vitamins-Minerals (THERAPEUTIC MULTIVITAMIN-MINERALS) tablet Take 1 tablet by mouth daily      Biotin 5000 MCG CAPS Take 1 capsule by mouth daily      Cyanocobalamin (VITAMIN B 12 PO) Take 1 tablet by mouth daily      Melatonin 2.5 MG CAPS Take 1 capsule by mouth nightly as needed           Vitals:    07/20/18 1428   BP: 118/84   Site: Left Arm   Position: Sitting   Cuff Size: Medium Adult   Pulse: 80   SpO2: 95%   Weight: 135 lb 6.4 oz (61.4 kg)   Height: 5' 1.5\" (1.562 m)     Body mass index is 25.17 kg/m². Physical Exam   Constitutional: She is oriented to person, place, and time. She appears well-developed and well-nourished. No distress. HENT:   Mouth/Throat: Oropharynx is clear and moist and mucous membranes are normal.   Eyes: Conjunctivae, EOM and lids are normal. Pupils are equal, round, and reactive to light. Neck: Neck supple. Carotid bruit is not present. No thyromegaly present. Cardiovascular: Normal rate, regular rhythm, S1 normal, S2 normal and normal heart sounds. Exam reveals no gallop and no friction rub. No murmur heard. Pulmonary/Chest: Effort normal and breath sounds normal. No respiratory distress. She has no wheezes. She has no rhonchi. She has no rales. Abdominal: Soft. Normal appearance and bowel sounds are normal. She exhibits no distension. There is no hepatosplenomegaly. There is no tenderness. Musculoskeletal: She exhibits no edema. Lymphadenopathy:        Head (right side): No submandibular adenopathy present. Head (left side): No submandibular adenopathy present. Neurological: She is alert and oriented to person, place, and time. Psychiatric: She has a normal mood and affect. Nursing note reviewed. No results found for this visit on 07/20/18.   Lab Review   Orders Only on 04/24/2018   Component Date Value    Sodium 04/24/2018 144     Potassium 04/24/2018 4.0     Chloride 04/24/2018 104     CO2 04/24/2018 25     Anion Gap 04/24/2018 15     Glucose 04/24/2018 95     BUN 04/24/2018 14     CREATININE 04/24/2018 0.7     GFR Non- 04/24/2018 >60     GFR  04/24/2018 >60     Calcium 04/24/2018 9.4     Phosphorus 04/24/2018 3.6     Alb 04/24/2018 4.7     Total Protein 04/24/2018 6.9     Albumin/Globulin Ratio 04/24/2018 2.1     Total Bilirubin 04/24/2018 0.7     Alkaline

## 2018-07-20 NOTE — LETTER
MEDICATION AGREEMENT     Marcelina Fulton Noss  [birth year]      For certain conditions, multiple classes of medications may be used to help better manage your symptoms, and to improve your ability to function at home, work and in social settings. However, these medications do have risks, which will be discussed with you, including addiction and dependency. The following prescribed medications need frequent monitoring and will require you to partner and assist in your healthcare. Medication  Dose, instructions and quantity as indicated on current prescription bottle Diagnosis/Reason(s) for Taking Category   clonazePAM (KLONOPIN) 1 MG tablet  1 tablet nightly as needed  Qty-30 per 30 days   insomnia                            Benefits and goals of Controlled Substance Medications: There are two potential goals for your treatment: (1) decreased pain and suffering (2) improved daily life functions. There are many possible treatments for your chronic condition(s), and, in addition to controlled substance medications, we will try alternatives such as physical therapy, yoga, massage, home daily exercise, meditation, relaxation techniques, injections, chiropractic manipulations, surgery, cognitive therapy, hypnosis and many medications that are not habit-forming. Use of controlled substance medications may be helpful, but they are unlikely to resolve all of your symptoms or restore all function. Risks of Controlled Substance Medications:    Opioid pain medications: These medications can lead to problems such as addiction/dependence, sedation, lightheadedness/dizziness, memory issues, falls, constipation, nausea, or vomiting. They may also impair the ability to drive or operate machinery. Additionally, these medications may lower testosterone levels, leading to loss of bone strength, stamina and sex drive.   They may cause problems with breathing, sleep apnea and stimulants, such as caffeine pills or energy drinks, certain weight loss supplements and oral decongestants. Dependence withdrawal symptoms may include depressed mood, loss of interest, suicidal thoughts, anxiety, fatigue, appetite changes and agitation. Testosterone replacement therapy:  Potential side effects include increased risk of stroke and heart attack, blood clots, increased blood pressure, increased cholesterol, enlarged prostate, sleep apnea, irritability/aggression and other mood disorders, and decreased fertility. Other:     1. I understand that I have the following responsibilities:  · I will take medications at the dose and frequency prescribed. · I will not increase or change how I take my medications without the approval of the health care provider who signs this Medication Agreement. · I will arrange for refills at the prescribed interval ONLY during regular office hours. I will not ask for refills earlier than agreed, after-hours, on holidays or on weekends. · I will obtain all refills for these medications at  ·  _____[facility] [address] [place] [address] [phone] -_______________________________  pharmacy (phone number  ·  ________________________), with full consent for my provider and pharmacist to exchange information in writing or verbally. · I will not request any pain medications or controlled substances from other providers and will inform this provider of all other medications I am taking. · I will inform my other health care providers that I am taking these medications and of the existence of this [facility]. In the event of an emergency, I will provide the same information to the emergency department providers. · I will protect my prescriptions and medications. I understand that lost or misplaced prescriptions will not be replaced.   · I will keep medications only for my own use and will not share them with · I develop significant side effects from the medication. · My behavior is inconsistent with the responsibilities outlined above, which may also result in my being prevented from receiving further care from this office. · Other:____________________________________________________________________    AGREEMENT:    I have read the above and have had all of my questions answered. For chronic disease management, I know that my symptoms can be managed with many types of treatments. A chronic medication trial may be part of my treatment, but I must be an active participant in my care. Medication therapy is only one part of my symptom management plan. In some cases, there may be limited scientific evidence to support the chronic use of certain medications to improve symptoms and daily function. Furthermore, in certain circumstances, there may be scientific information that suggests that use of chronic controlled substances may actually worsen my symptoms and increase my risk of unintentional death directly related to this medication therapy. I know that if my provider feels my risk from controlled medications is greater than my benefit, I will have my controlled substance medication(s) compassionately lowered or removed altogether. I agree to a controlled substance medication trial.      I further agree to allow this office to contact family or friends if there are concerns about my safety and use of the controlled medications. I have agreed to use the following medications above as instructed by my physician and as stated in this [place] [facility].      Patient Signature:  ______________________  Date:7/20/2018 or _____________    Provider Signature:__  ____________________  Date:7/20/2018 or _____________

## 2018-07-20 NOTE — PATIENT INSTRUCTIONS
-Continue same medications  -Discontinue Lotrel  -start Amlodipine 2.5mg once daily for blood pressure  -Low sodium diet  -Low fat, low cholesterol diet  -Regular aerobic exercise  -monitor blood pressure  -Referral to GI for EGD and colonoscopy  -Schedule EMG  -Referral to Podiatry

## 2018-07-21 DIAGNOSIS — R06.02 SHORTNESS OF BREATH: ICD-10-CM

## 2018-07-21 DIAGNOSIS — E78.00 PURE HYPERCHOLESTEROLEMIA: ICD-10-CM

## 2018-07-21 DIAGNOSIS — R07.89 ATYPICAL CHEST PAIN: ICD-10-CM

## 2018-07-23 RX ORDER — ATORVASTATIN CALCIUM 40 MG/1
40 TABLET, FILM COATED ORAL DAILY
Qty: 30 TABLET | Refills: 3 | Status: SHIPPED | OUTPATIENT
Start: 2018-07-23 | End: 2018-12-31 | Stop reason: DRUGHIGH

## 2018-07-26 LAB
6-ACETYLMORPHINE: NOT DETECTED
7-AMINOCLONAZEPAM: NOT DETECTED
ALPHA-OH-ALPRAZOLAM: NOT DETECTED
ALPRAZOLAM: NOT DETECTED
AMPHETAMINE: NOT DETECTED
BARBITURATES: NOT DETECTED
BENZOYLECGONINE: NOT DETECTED
BUPRENORPHINE: NOT DETECTED
CARISOPRODOL: NOT DETECTED
CLONAZEPAM: NOT DETECTED
CODEINE: NOT DETECTED
CREATININE URINE: 55.4 MG/DL (ref 20–400)
DIAZEPAM: NOT DETECTED
DRUGS EXPECTED: NORMAL
EER PAIN MGT DRUG PANEL, HIGH RES/EMIT U: NORMAL
ETHYL GLUCURONIDE: NOT DETECTED
FENTANYL: NOT DETECTED
HYDROCODONE: NOT DETECTED
HYDROMORPHONE: NOT DETECTED
LORAZEPAM: NOT DETECTED
MARIJUANA METABOLITE: NOT DETECTED
MDA: NOT DETECTED
MDEA: NOT DETECTED
MDMA URINE: NOT DETECTED
MEPERIDINE: NOT DETECTED
METHADONE: NOT DETECTED
METHAMPHETAMINE: NOT DETECTED
METHYLPHENIDATE: NOT DETECTED
MIDAZOLAM: NOT DETECTED
MORPHINE: NOT DETECTED
NORBUPRENORPHINE, FREE: NOT DETECTED
NORDIAZEPAM: NOT DETECTED
NORFENTANYL: NOT DETECTED
NORHYDROCODONE, URINE: NOT DETECTED
NOROXYCODONE: NOT DETECTED
NOROXYMORPHONE, URINE: NOT DETECTED
OXAZEPAM: NOT DETECTED
OXYCODONE: NOT DETECTED
OXYMORPHONE: NOT DETECTED
PAIN MANAGEMENT DRUG PANEL: NORMAL
PAIN MANAGEMENT DRUG PANEL: NORMAL
PCP: NOT DETECTED
PHENTERMINE: NOT DETECTED
PROPOXYPHENE: NOT DETECTED
TAPENTADOL, URINE: NOT DETECTED
TAPENTADOL-O-SULFATE, URINE: NOT DETECTED
TEMAZEPAM: NOT DETECTED
TRAMADOL: NOT DETECTED
ZOLPIDEM: NOT DETECTED

## 2018-08-22 ENCOUNTER — TELEPHONE (OUTPATIENT)
Dept: INTERNAL MEDICINE CLINIC | Age: 53
End: 2018-08-22

## 2018-08-22 DIAGNOSIS — L98.9 SKIN LESION: Primary | ICD-10-CM

## 2018-08-22 NOTE — TELEPHONE ENCOUNTER
Pt called in stating that she has a dime-sized skin lesion on her left shoulder underneath where her bra strap rubs. When she was last seen here with Dr. Ilana Gan, she had this lesion & Dr. Ilana Gan looked at it - pt was unsure if she should go to the derm or come back in for an appt; declined an appt right now, if at all possible, due to having been in the office in July. Pt was also wondering if Dr. Ilana Gan could refer any dermatologists to her for if she would need to see one.  Please call pt back

## 2018-10-23 ENCOUNTER — CLINICAL DOCUMENTATION (OUTPATIENT)
Dept: INTERNAL MEDICINE CLINIC | Age: 53
End: 2018-10-23

## 2018-11-19 ENCOUNTER — TELEPHONE (OUTPATIENT)
Dept: INTERNAL MEDICINE CLINIC | Age: 53
End: 2018-11-19

## 2018-11-19 DIAGNOSIS — E78.2 MIXED HYPERLIPIDEMIA: Primary | ICD-10-CM

## 2018-12-13 DIAGNOSIS — I10 ESSENTIAL HYPERTENSION: ICD-10-CM

## 2018-12-13 DIAGNOSIS — E78.2 MIXED HYPERLIPIDEMIA: ICD-10-CM

## 2018-12-13 DIAGNOSIS — R53.83 FATIGUE, UNSPECIFIED TYPE: ICD-10-CM

## 2018-12-13 DIAGNOSIS — R20.2 PARESTHESIAS: ICD-10-CM

## 2018-12-13 LAB
ANION GAP SERPL CALCULATED.3IONS-SCNC: 14 MMOL/L (ref 3–16)
BASOPHILS ABSOLUTE: 0 K/UL (ref 0–0.2)
BASOPHILS RELATIVE PERCENT: 0.8 %
BUN BLDV-MCNC: 16 MG/DL (ref 7–20)
CALCIUM SERPL-MCNC: 9.3 MG/DL (ref 8.3–10.6)
CHLORIDE BLD-SCNC: 105 MMOL/L (ref 99–110)
CHOLESTEROL, TOTAL: 189 MG/DL (ref 0–199)
CO2: 23 MMOL/L (ref 21–32)
CREAT SERPL-MCNC: 0.9 MG/DL (ref 0.6–1.1)
EOSINOPHILS ABSOLUTE: 0 K/UL (ref 0–0.6)
EOSINOPHILS RELATIVE PERCENT: 1.1 %
GFR AFRICAN AMERICAN: >60
GFR NON-AFRICAN AMERICAN: >60
GLUCOSE BLD-MCNC: 104 MG/DL (ref 70–99)
HCT VFR BLD CALC: 34.9 % (ref 36–48)
HDLC SERPL-MCNC: 79 MG/DL (ref 40–60)
HEMOGLOBIN: 11.3 G/DL (ref 12–16)
LDL CHOLESTEROL CALCULATED: 89 MG/DL
LYMPHOCYTES ABSOLUTE: 1.5 K/UL (ref 1–5.1)
LYMPHOCYTES RELATIVE PERCENT: 36.3 %
MCH RBC QN AUTO: 30.3 PG (ref 26–34)
MCHC RBC AUTO-ENTMCNC: 32.5 G/DL (ref 31–36)
MCV RBC AUTO: 93.1 FL (ref 80–100)
MONOCYTES ABSOLUTE: 0.2 K/UL (ref 0–1.3)
MONOCYTES RELATIVE PERCENT: 5.6 %
NEUTROPHILS ABSOLUTE: 2.3 K/UL (ref 1.7–7.7)
NEUTROPHILS RELATIVE PERCENT: 56.2 %
PDW BLD-RTO: 13.1 % (ref 12.4–15.4)
PLATELET # BLD: 230 K/UL (ref 135–450)
PMV BLD AUTO: 9.6 FL (ref 5–10.5)
POTASSIUM SERPL-SCNC: 4.1 MMOL/L (ref 3.5–5.1)
RBC # BLD: 3.74 M/UL (ref 4–5.2)
SODIUM BLD-SCNC: 142 MMOL/L (ref 136–145)
TRIGL SERPL-MCNC: 104 MG/DL (ref 0–150)
VLDLC SERPL CALC-MCNC: 21 MG/DL
WBC # BLD: 4.1 K/UL (ref 4–11)

## 2018-12-14 LAB
VITAMIN B-12: 405 PG/ML (ref 211–911)
VITAMIN D 25-HYDROXY: 14.8 NG/ML

## 2018-12-17 ENCOUNTER — TELEPHONE (OUTPATIENT)
Dept: INTERNAL MEDICINE CLINIC | Age: 53
End: 2018-12-17

## 2018-12-31 ENCOUNTER — OFFICE VISIT (OUTPATIENT)
Dept: INTERNAL MEDICINE CLINIC | Age: 53
End: 2018-12-31
Payer: COMMERCIAL

## 2018-12-31 VITALS
RESPIRATION RATE: 14 BRPM | BODY MASS INDEX: 24.35 KG/M2 | DIASTOLIC BLOOD PRESSURE: 70 MMHG | HEIGHT: 62 IN | TEMPERATURE: 97.9 F | SYSTOLIC BLOOD PRESSURE: 122 MMHG | WEIGHT: 132.3 LBS | HEART RATE: 100 BPM | OXYGEN SATURATION: 97 %

## 2018-12-31 DIAGNOSIS — R31.29 MICROSCOPIC HEMATURIA: ICD-10-CM

## 2018-12-31 DIAGNOSIS — D64.9 ANEMIA, UNSPECIFIED TYPE: ICD-10-CM

## 2018-12-31 DIAGNOSIS — R73.9 HYPERGLYCEMIA: ICD-10-CM

## 2018-12-31 DIAGNOSIS — Z00.00 ANNUAL PHYSICAL EXAM: Primary | ICD-10-CM

## 2018-12-31 DIAGNOSIS — I10 ESSENTIAL HYPERTENSION: ICD-10-CM

## 2018-12-31 DIAGNOSIS — F41.9 ANXIETY: ICD-10-CM

## 2018-12-31 DIAGNOSIS — F51.01 PRIMARY INSOMNIA: ICD-10-CM

## 2018-12-31 DIAGNOSIS — E55.9 VITAMIN D DEFICIENCY: ICD-10-CM

## 2018-12-31 DIAGNOSIS — E78.2 MIXED HYPERLIPIDEMIA: ICD-10-CM

## 2018-12-31 DIAGNOSIS — Z00.00 ANNUAL PHYSICAL EXAM: ICD-10-CM

## 2018-12-31 LAB
ALBUMIN SERPL-MCNC: 4.7 G/DL (ref 3.4–5)
ALP BLD-CCNC: 74 U/L (ref 40–129)
ALT SERPL-CCNC: 21 U/L (ref 10–40)
AST SERPL-CCNC: 33 U/L (ref 15–37)
BILIRUB SERPL-MCNC: 0.7 MG/DL (ref 0–1)
BILIRUBIN DIRECT: <0.2 MG/DL (ref 0–0.3)
BILIRUBIN, INDIRECT: NORMAL MG/DL (ref 0–1)
BILIRUBIN, POC: ABNORMAL
BLOOD URINE, POC: ABNORMAL
CLARITY, POC: CLEAR
COLOR, POC: YELLOW
EPITHELIAL CELLS, UA: 1 /HPF (ref 0–5)
FERRITIN: 56.8 NG/ML (ref 15–150)
GLUCOSE URINE, POC: ABNORMAL
HYALINE CASTS: 0 /LPF (ref 0–8)
IRON SATURATION: 34 % (ref 15–50)
IRON: 129 UG/DL (ref 37–145)
KETONES, POC: ABNORMAL
LEUKOCYTE EST, POC: ABNORMAL
NITRITE, POC: ABNORMAL
PH, POC: 6
PROTEIN, POC: ABNORMAL
RBC UA: 0 /HPF (ref 0–4)
SPECIFIC GRAVITY, POC: 1.02
TOTAL IRON BINDING CAPACITY: 381 UG/DL (ref 260–445)
TOTAL PROTEIN: 7.5 G/DL (ref 6.4–8.2)
TSH SERPL DL<=0.05 MIU/L-ACNC: 1.73 UIU/ML (ref 0.27–4.2)
UROBILINOGEN, POC: 0.2
WBC UA: 1 /HPF (ref 0–5)

## 2018-12-31 PROCEDURE — 81002 URINALYSIS NONAUTO W/O SCOPE: CPT | Performed by: INTERNAL MEDICINE

## 2018-12-31 PROCEDURE — 99396 PREV VISIT EST AGE 40-64: CPT | Performed by: INTERNAL MEDICINE

## 2018-12-31 RX ORDER — ERGOCALCIFEROL 1.25 MG/1
50000 CAPSULE ORAL WEEKLY
Qty: 4 CAPSULE | Refills: 3 | Status: SHIPPED | OUTPATIENT
Start: 2018-12-31 | End: 2019-05-06 | Stop reason: SDUPTHER

## 2018-12-31 RX ORDER — HYDROXYZINE PAMOATE 25 MG/1
CAPSULE ORAL
Qty: 60 CAPSULE | Refills: 0 | Status: SHIPPED | OUTPATIENT
Start: 2018-12-31 | End: 2020-08-28

## 2018-12-31 RX ORDER — ATORVASTATIN CALCIUM 40 MG/1
20 TABLET, FILM COATED ORAL NIGHTLY
COMMUNITY
Start: 2018-12-31 | End: 2019-02-27 | Stop reason: SDUPTHER

## 2018-12-31 NOTE — PATIENT INSTRUCTIONS
vitamin D do you need? The [place] [employer] recommends that people ages 3 through 79 get 600 IU (international units) every day. Adults 71 and older need 800 IU every day. Blood tests for vitamin D can check your vitamin D level. But there is no standard normal range used by all laboratories. You're likely getting enough vitamin D if your levels are in the range of 20 to 50 ng/mL. How can you get more vitamin D? Foods that contain vitamin D include:  · Cutler, tuna, and mackerel. These are some of the best foods to eat when you need to get more vitamin D.  · Cheese, egg yolks, and beef liver. These foods have vitamin D in small amounts. · Milk, soy drinks, orange juice, yogurt, margarine, and some kinds of cereal have vitamin D added to them. Some people don't make vitamin D as well as others. They may have to take extra care in getting enough vitamin D. Things that reduce how much vitamin D your body makes include:  · Dark skin, such as many  Americans have. · Age, especially if you are older than 72. · Digestive problems, such as Crohn's or celiac disease. · Liver and kidney disease. Some people who do not get enough vitamin D may need supplements. Are there any risks from taking vitamin D?  · Too much vitamin D:  ? Can damage your kidneys. ? Can cause nausea and vomiting, constipation, and weakness. ? Raises the amount of calcium in your blood. If this happens, you can get confused or have an irregular heart rhythm. · Vitamin D may interact with other medicines. Tell your doctor about all of the medicines you take, including over-the-counter drugs, herbs, and pills. Tell your doctor about all of your current medical problems. Where can you learn more? Go to https://carlos.[employer]. org and sign in to your [employer] account. Enter [id number] in the [employer] box to learn more about \"Learning About Vitamin D. \"     If you do not have an account, please click on the

## 2018-12-31 NOTE — PROGRESS NOTES
are equal, round, and reactive to light. Conjunctivae, EOM and lids are normal.   Neck: Neck supple. Carotid bruit is not present. No thyromegaly present. Cardiovascular: Normal rate, regular rhythm, S1 normal, S2 normal, normal heart sounds and intact distal pulses. Exam reveals no gallop and no friction rub. No murmur heard. Pulmonary/Chest: Effort normal and breath sounds normal. No respiratory distress. She has no wheezes. She has no rhonchi. She has no rales. Abdominal: Soft. Bowel sounds are normal. She exhibits no distension and no mass. There is no hepatosplenomegaly. There is no tenderness. There is no rebound. Genitourinary:   Genitourinary Comments: Deferred   Musculoskeletal: Normal range of motion. She exhibits no edema. Right shoulder: She exhibits normal range of motion and no tenderness. Left shoulder: She exhibits normal range of motion and no tenderness. Right elbow: She exhibits no swelling. No tenderness found. Left elbow: She exhibits no swelling. No tenderness found. Right wrist: She exhibits no tenderness and no swelling. Left wrist: She exhibits no tenderness and no swelling. Right hip: She exhibits no tenderness. Left hip: She exhibits no tenderness. Right knee: She exhibits no swelling. No tenderness found. Left knee: She exhibits no swelling. No tenderness found. Right ankle: She exhibits no swelling. No tenderness. Left ankle: She exhibits no swelling. No tenderness. Cervical back: She exhibits normal range of motion, no tenderness and no spasm. Thoracic back: She exhibits no tenderness and no spasm. Lumbar back: She exhibits normal range of motion, no tenderness and no spasm. Right upper arm: She exhibits no tenderness. Left upper arm: She exhibits no tenderness. Right hand: She exhibits no tenderness and no swelling.         Left hand: She exhibits no 12/13/2018 0.0     Basophils # 12/13/2018 0.0     Vitamin B-12 12/13/2018 405     Vit D, 25-Hydroxy 12/13/2018 14.8*    Cholesterol, Total 12/13/2018 189     Triglycerides 12/13/2018 104     HDL 12/13/2018 79*    LDL Calculated 12/13/2018 89     VLDL Cholesterol Calcula* 12/13/2018 21    Office Visit on 07/20/2018   Component Date Value    Drugs Expected 07/20/2018 See Interp     Pain Management Drug Pan* 07/20/2018 Consistent     Creatinine, Ur 07/20/2018 55.4     Codeine 07/20/2018 Not Detected     Morphine 07/20/2018 Not Detected     6-Acetylmorphine 07/20/2018 Not Detected     Oxycodone 07/20/2018 Not Detected     Noroxycodone 07/20/2018 Not Detected     Oxymorphone 07/20/2018 Not Detected     NOROXYMORPHONE, URINE 07/20/2018 Not Detected     Hydrocodone 07/20/2018 Not Detected     NORHYDROCODONE, URINE 07/20/2018 Not Detected     Hydromorphone 07/20/2018 Not Detected     Buprenorphine 07/20/2018 Not Detected     Norbuprenorphine 07/20/2018 Not Detected     Fentanyl 07/20/2018 Not Detected     Norfentanyl 07/20/2018 Not Detected     Meperidine 07/20/2018 Not Detected     Tapentadol, Urine 07/20/2018 Not Detected     Tapentadol-O-Sulfate, Ur* 07/20/2018 Not Detected     Methadone 07/20/2018 Not Detected     Propoxyphene 07/20/2018 Not Detected     Tramadol 07/20/2018 Not Detected     Amphetamine 07/20/2018 Not Detected     Methamphetamine 07/20/2018 Not Detected     MDMA, Urine 07/20/2018 Not Detected     MDA 07/20/2018 Not Detected     MDEA 07/20/2018 Not Detected     Methylphenidate 07/20/2018 Not Detected     Phentermine 07/20/2018 Not Detected     Benzoylecgonine 07/20/2018 Not Detected     Alprazolam 07/20/2018 Not Detected     Alpha-OH-alprazolam 07/20/2018 Not Detected     Clonazepam 07/20/2018 Not Detected     7-aminoclonazepam 07/20/2018 Not Detected     Diazepam 07/20/2018 Not Detected     NORDIAZEPAM 07/20/2018 Not Detected     OXAZEPAM 07/20/2018 Not

## 2019-01-01 LAB
ESTIMATED AVERAGE GLUCOSE: 116.9 MG/DL
HBA1C MFR BLD: 5.7 %

## 2019-01-02 ENCOUNTER — CLINICAL DOCUMENTATION (OUTPATIENT)
Dept: INTERNAL MEDICINE CLINIC | Age: 54
End: 2019-01-02

## 2019-02-27 DIAGNOSIS — E78.2 MIXED HYPERLIPIDEMIA: ICD-10-CM

## 2019-02-27 RX ORDER — ATORVASTATIN CALCIUM 40 MG/1
20 TABLET, FILM COATED ORAL NIGHTLY
Qty: 15 TABLET | Refills: 3 | Status: SHIPPED | OUTPATIENT
Start: 2019-02-27 | End: 2019-04-04 | Stop reason: DRUGHIGH

## 2019-04-02 DIAGNOSIS — A60.00 GENITAL HERPES SIMPLEX, UNSPECIFIED SITE: Primary | ICD-10-CM

## 2019-04-02 RX ORDER — VALACYCLOVIR HYDROCHLORIDE 500 MG/1
TABLET, FILM COATED ORAL
Qty: 10 TABLET | Refills: 3 | Status: SHIPPED | OUTPATIENT
Start: 2019-04-02 | End: 2019-04-04 | Stop reason: SDUPTHER

## 2019-04-02 NOTE — TELEPHONE ENCOUNTER
Medication:   Requested Prescriptions     Pending Prescriptions Disp Refills    valACYclovir (VALTREX) 500 MG tablet [Pharmacy Med Name: VALACYCLOVIR 500MG TABLETS] 10 tablet 0     Sig: TAKE 1 TABLET BY MOUTH TWICE DAILY FOR 5 DAYS        Last Filled:      Patient Phone Number: [phone] (home)     Last appt: 7/20/2018 12-31-2-18  Next appt: Visit date not found    Last OARRS:   RX Monitoring 12/31/2018   Attestation The Prescription Monitoring Report for this patient was reviewed today. Chronic Pain Routine Monitoring No signs of potential drug abuse or diversion identified.        Preferred Pharmacy:   [facility] [address] [phone] - F [phone]  [address] [place] [phone]  Phone: [phone] Fax: [phone]    [address] [phone] [facility] [phone]  [address] [place] [address]  Phone: [phone] Fax: [phone]

## 2019-04-04 ENCOUNTER — OFFICE VISIT (OUTPATIENT)
Dept: INTERNAL MEDICINE CLINIC | Age: 54
End: 2019-04-04
Payer: COMMERCIAL

## 2019-04-04 VITALS
SYSTOLIC BLOOD PRESSURE: 160 MMHG | HEIGHT: 62 IN | OXYGEN SATURATION: 98 % | DIASTOLIC BLOOD PRESSURE: 100 MMHG | HEART RATE: 65 BPM | TEMPERATURE: 98.6 F | BODY MASS INDEX: 24.11 KG/M2 | WEIGHT: 131 LBS

## 2019-04-04 DIAGNOSIS — R73.03 PREDIABETES: ICD-10-CM

## 2019-04-04 DIAGNOSIS — A60.00 GENITAL HERPES SIMPLEX, UNSPECIFIED SITE: ICD-10-CM

## 2019-04-04 DIAGNOSIS — E55.9 VITAMIN D DEFICIENCY: ICD-10-CM

## 2019-04-04 DIAGNOSIS — F51.01 PRIMARY INSOMNIA: ICD-10-CM

## 2019-04-04 DIAGNOSIS — I10 ESSENTIAL HYPERTENSION: ICD-10-CM

## 2019-04-04 DIAGNOSIS — E78.2 MIXED HYPERLIPIDEMIA: Primary | ICD-10-CM

## 2019-04-04 DIAGNOSIS — J30.9 ALLERGIC RHINITIS, UNSPECIFIED SEASONALITY, UNSPECIFIED TRIGGER: ICD-10-CM

## 2019-04-04 DIAGNOSIS — F41.9 ANXIETY: ICD-10-CM

## 2019-04-04 DIAGNOSIS — E78.2 MIXED HYPERLIPIDEMIA: ICD-10-CM

## 2019-04-04 LAB
A/G RATIO: 1.8 (ref 1.1–2.2)
ALBUMIN SERPL-MCNC: 4.6 G/DL (ref 3.4–5)
ALP BLD-CCNC: 74 U/L (ref 40–129)
ALT SERPL-CCNC: 10 U/L (ref 10–40)
ANION GAP SERPL CALCULATED.3IONS-SCNC: 12 MMOL/L (ref 3–16)
AST SERPL-CCNC: 19 U/L (ref 15–37)
BILIRUB SERPL-MCNC: 0.6 MG/DL (ref 0–1)
BUN BLDV-MCNC: 12 MG/DL (ref 7–20)
CALCIUM SERPL-MCNC: 9.7 MG/DL (ref 8.3–10.6)
CHLORIDE BLD-SCNC: 109 MMOL/L (ref 99–110)
CHOLESTEROL, TOTAL: 193 MG/DL (ref 0–199)
CO2: 25 MMOL/L (ref 21–32)
CREAT SERPL-MCNC: 0.8 MG/DL (ref 0.6–1.1)
GFR AFRICAN AMERICAN: >60
GFR NON-AFRICAN AMERICAN: >60
GLOBULIN: 2.5 G/DL
GLUCOSE BLD-MCNC: 100 MG/DL (ref 70–99)
HDLC SERPL-MCNC: 80 MG/DL (ref 40–60)
LDL CHOLESTEROL CALCULATED: 100 MG/DL
POTASSIUM SERPL-SCNC: 3.9 MMOL/L (ref 3.5–5.1)
SODIUM BLD-SCNC: 146 MMOL/L (ref 136–145)
TOTAL PROTEIN: 7.1 G/DL (ref 6.4–8.2)
TRIGL SERPL-MCNC: 63 MG/DL (ref 0–150)
VITAMIN D 25-HYDROXY: 40.5 NG/ML
VLDLC SERPL CALC-MCNC: 13 MG/DL

## 2019-04-04 PROCEDURE — 99214 OFFICE O/P EST MOD 30 MIN: CPT | Performed by: INTERNAL MEDICINE

## 2019-04-04 RX ORDER — AMLODIPINE BESYLATE 2.5 MG/1
2.5 TABLET ORAL DAILY
Qty: 30 TABLET | Refills: 3 | Status: SHIPPED | OUTPATIENT
Start: 2019-04-04 | End: 2019-07-01

## 2019-04-04 RX ORDER — FLUTICASONE PROPIONATE 50 MCG
2 SPRAY, SUSPENSION (ML) NASAL DAILY
Qty: 1 BOTTLE | Refills: 2 | Status: SHIPPED | OUTPATIENT
Start: 2019-04-04 | End: 2019-07-01 | Stop reason: SDUPTHER

## 2019-04-04 RX ORDER — CLONAZEPAM 1 MG/1
TABLET ORAL
Qty: 30 TABLET | Refills: 2 | Status: SHIPPED | OUTPATIENT
Start: 2019-04-04 | End: 2019-10-21 | Stop reason: SDUPTHER

## 2019-04-04 RX ORDER — ATORVASTATIN CALCIUM 20 MG/1
20 TABLET, FILM COATED ORAL DAILY
Qty: 30 TABLET | Refills: 5 | Status: SHIPPED | OUTPATIENT
Start: 2019-04-04 | End: 2020-02-24

## 2019-04-04 RX ORDER — VALACYCLOVIR HYDROCHLORIDE 500 MG/1
TABLET, FILM COATED ORAL
Qty: 10 TABLET | Refills: 3 | Status: SHIPPED | OUTPATIENT
Start: 2019-04-04 | End: 2019-11-25 | Stop reason: SDUPTHER

## 2019-04-04 ASSESSMENT — PATIENT HEALTH QUESTIONNAIRE - PHQ9
SUM OF ALL RESPONSES TO PHQ QUESTIONS 1-9: 0
2. FEELING DOWN, DEPRESSED OR HOPELESS: 0
SUM OF ALL RESPONSES TO PHQ QUESTIONS 1-9: 0
1. LITTLE INTEREST OR PLEASURE IN DOING THINGS: 0
SUM OF ALL RESPONSES TO PHQ9 QUESTIONS 1 & 2: 0

## 2019-04-04 NOTE — PATIENT INSTRUCTIONS
1. Mixed hyperlipidemia  - Continue atorvastatin (LIPITOR) 20 MG tablet; Take 1 tablet by mouth daily  Dispense: 30 tablet; Refill: 5  - Comprehensive Metabolic Panel; Future  - Lipid Panel; Future  -Low fat, low cholesterol diet  -Regular aerobic exercise    2. Essential hypertension  - Blood pressure is high today  - Start amLODIPine (NORVASC) 2.5 MG tablet; Take 1 tablet by mouth daily  Dispense: 30 tablet; Refill: 3  - Comprehensive Metabolic Panel; Future  - Have Nurse at work monitor blood pressure and fax readings in the next 1-2 weeks or send [employer] message with blood pressure readings  -Low sodium diet  -Regular aerobic exercise  -Discontinue Sudafed and avoid decongestants    3. Primary insomnia  -Resume clonazePAM (KLONOPIN) 1 MG tablet; Take 1/2- 1 tablet nightly as needed for insomnia and 1/2 tablet once daily as needed for anxiety  Dispense: 30 tablet; Refill: 2  -Continue Hydroxyzine as needed    4. Anxiety  - Resume clonazePAM (KLONOPIN) 1 MG tablet; Take 1/2- 1 tablet nightly as needed for insomnia and 1/2 tablet once daily as needed for anxiety  Dispense: 30 tablet; Refill: 2    5. Vitamin D deficiency  -Continue Vitamin D 50,000 IU once a week  - Vitamin D 25 Hydroxy; Future    6. Allergic rhinitis, unspecified seasonality, unspecified trigger  - fluticasone (FLONASE) 50 MCG/ACT nasal spray; 2 sprays by Nasal route daily  Dispense: 1 Bottle; Refill: 2  -Discontinue Sudafed and avoid decongestants due to high blood pressure    7. Genital herpes simplex, unspecified site  - valACYclovir (VALTREX) 500 MG tablet; TAKE 1 TABLET BY MOUTH TWICE DAILY FOR 5 DAYS  Dispense: 10 tablet; Refill: 3    8.  Prediabetes  -Low carbohydrate diet  -Regular aerobic exercise  - Hemoglobin A1C; Future

## 2019-04-04 NOTE — PROGRESS NOTES
Mouth/Throat: Oropharynx is clear and moist and mucous membranes are normal.   Eyes: Pupils are equal, round, and reactive to light. Conjunctivae, EOM and lids are normal.   Neck: Neck supple. Carotid bruit is not present. No thyromegaly present. Cardiovascular: Normal rate, regular rhythm, S1 normal, S2 normal and normal heart sounds. Exam reveals no gallop and no friction rub. No murmur heard. Pulmonary/Chest: Effort normal and breath sounds normal. No respiratory distress. She has no wheezes. She has no rhonchi. She has no rales. Abdominal: Soft. Normal appearance and bowel sounds are normal. She exhibits no distension. There is no hepatosplenomegaly. There is no tenderness. Musculoskeletal: She exhibits no edema. Lymphadenopathy:        Head (right side): No submandibular adenopathy present. Head (left side): No submandibular adenopathy present. Neurological: She is alert and oriented to person, place, and time. Psychiatric: She has a normal mood and affect. Nursing note reviewed. No results found for this visit on 04/04/19.   Lab Review   Orders Only on 12/31/2018   Component Date Value    Iron 12/31/2018 129     TIBC 12/31/2018 381     Iron Saturation 12/31/2018 34     Ferritin 12/31/2018 56.8     Hemoglobin A1C 12/31/2018 5.7     eAG 12/31/2018 116.9     TSH 12/31/2018 1.73     Total Protein 12/31/2018 7.5     Alb 12/31/2018 4.7     Alkaline Phosphatase 12/31/2018 74     ALT 12/31/2018 21     AST 12/31/2018 33     Total Bilirubin 12/31/2018 0.7     Bilirubin, Direct 12/31/2018 <0.2     Bilirubin, Indirect 12/31/2018 see below    Office Visit on 12/31/2018   Component Date Value    Color, UA 12/31/2018 Yellow     Clarity, UA 12/31/2018 Clear     Glucose, UA POC 12/31/2018 N     Bilirubin, UA 12/31/2018 N     Ketones, UA 12/31/2018 N     Spec Grav, UA 12/31/2018 1.020     Blood, UA POC 12/31/2018 Trace-Lysed     pH, UA 12/31/2018 6.0     Protein, UA POC Future  - Have Nurse at work monitor blood pressure and fax readings in the next 1-2 weeks or send [employer] message with blood pressure readings  -Low sodium diet  -Regular aerobic exercise  -Discontinue Sudafed and avoid decongestants    3. Primary insomnia  -Resume clonazePAM (KLONOPIN) 1 MG tablet; Take 1/2- 1 tablet nightly as needed for insomnia and 1/2 tablet once daily as needed for anxiety  Dispense: 30 tablet; Refill: 2  -Continue Hydroxyzine as needed    4. Anxiety  - Resume clonazePAM (KLONOPIN) 1 MG tablet; Take 1/2- 1 tablet nightly as needed for insomnia and 1/2 tablet once daily as needed for anxiety  Dispense: 30 tablet; Refill: 2    5. Vitamin D deficiency  -Continue Vitamin D 50,000 IU once a week  - Vitamin D 25 Hydroxy; Future    6. Allergic rhinitis, unspecified seasonality, unspecified trigger  - fluticasone (FLONASE) 50 MCG/ACT nasal spray; 2 sprays by Nasal route daily  Dispense: 1 Bottle; Refill: 2  -Discontinue Sudafed and avoid decongestants due to high blood pressure    7. Genital herpes simplex, unspecified site  - valACYclovir (VALTREX) 500 MG tablet; TAKE 1 TABLET BY MOUTH TWICE DAILY FOR 5 DAYS  Dispense: 10 tablet; Refill: 3    8. Prediabetes  -Low carbohydrate diet  -Regular aerobic exercise  - Hemoglobin A1C; Future      Discussed medications with patient, who voiced understanding of their use and indications. All questions answered. Controlled Substances Monitoring:     RX Monitoring 4/4/2019   Attestation The Prescription Monitoring Report for this patient was reviewed today. Chronic Pain Routine Monitoring No signs of potential drug abuse or diversion identified: otherwise, see note documentation         Return in about 3 months (around 7/4/2019) for hypertension, insomnia, anxiety, and hyperlipidemia.

## 2019-04-04 NOTE — PROGRESS NOTES
PHQ Scores 4/4/2019 2/27/2018 11/16/2017 8/17/2016   PHQ2 Score 0 0 0 0   PHQ9 Score 0 0 0 0     Interpretation of Total Score Depression Severity: 1-4 = Minimal depression, 5-9 = Mild depression, 10-14 = Moderate depression, 15-19 = Moderately severe depression, 20-27 = Severe depression

## 2019-04-05 LAB
ESTIMATED AVERAGE GLUCOSE: 116.9 MG/DL
HBA1C MFR BLD: 5.7 %

## 2019-04-12 ASSESSMENT — ENCOUNTER SYMPTOMS
WHEEZING: 0
SINUS PRESSURE: 0
DIARRHEA: 0
RHINORRHEA: 1
CHEST TIGHTNESS: 0
BLOOD IN STOOL: 0
SORE THROAT: 0
COUGH: 0
CONSTIPATION: 0
VOMITING: 0
SHORTNESS OF BREATH: 0
NAUSEA: 0
ABDOMINAL PAIN: 0

## 2019-05-06 DIAGNOSIS — E55.9 VITAMIN D DEFICIENCY: ICD-10-CM

## 2019-05-06 RX ORDER — ERGOCALCIFEROL 1.25 MG/1
50000 CAPSULE ORAL WEEKLY
Qty: 4 CAPSULE | Refills: 3 | Status: SHIPPED | OUTPATIENT
Start: 2019-05-06 | End: 2019-07-01 | Stop reason: SDUPTHER

## 2019-05-06 NOTE — TELEPHONE ENCOUNTER
Medication:   Requested Prescriptions     Pending Prescriptions Disp Refills    vitamin D (ERGOCALCIFEROL) 86783 units CAPS capsule 4 capsule 3     Sig: Take 1 capsule by mouth once a week        Last Filled:      Patient Phone Number: [phone] (home)     Last appt: 4/4/2019   Next appt: 7/1/2019    Last OARRS:   RX Monitoring 4/4/2019   Attestation The Prescription Monitoring Report for this patient was reviewed today.    Chronic Pain Routine Monitoring No signs of potential drug abuse or diversion identified: otherwise, see note documentation       Preferred Pharmacy:   [facility] [place], [address] - P [phone] - F [phone]  [address] [place] [address]  Phone: [phone] Fax: [phone]    [facility], [address] [place] [address] [phone] Eleazar Liu [phone]  [address] [place] [address]  Phone: [phone] Fax: [phone] [place] [phone] [facility], [address] [place] [address] [phone] Eleazar Tiny [employer] [phone]  [address] [facility] [address]  Phone: [phone] Fax: [phone]

## 2019-05-07 NOTE — TELEPHONE ENCOUNTER
Spoke with patient to inform her that I never received a Rx for Vitamin D and never denied it previously. I did get the refill request today and sent refills to [facility].

## 2019-07-01 ENCOUNTER — OFFICE VISIT (OUTPATIENT)
Dept: INTERNAL MEDICINE CLINIC | Age: 54
End: 2019-07-01
Payer: COMMERCIAL

## 2019-07-01 VITALS
DIASTOLIC BLOOD PRESSURE: 96 MMHG | SYSTOLIC BLOOD PRESSURE: 142 MMHG | HEIGHT: 62 IN | HEART RATE: 80 BPM | RESPIRATION RATE: 16 BRPM | OXYGEN SATURATION: 97 % | WEIGHT: 132.4 LBS | TEMPERATURE: 98.1 F | BODY MASS INDEX: 24.37 KG/M2

## 2019-07-01 DIAGNOSIS — J30.9 ALLERGIC RHINITIS, UNSPECIFIED SEASONALITY, UNSPECIFIED TRIGGER: ICD-10-CM

## 2019-07-01 DIAGNOSIS — F51.01 PRIMARY INSOMNIA: ICD-10-CM

## 2019-07-01 DIAGNOSIS — F41.9 ANXIETY: ICD-10-CM

## 2019-07-01 DIAGNOSIS — E55.9 VITAMIN D DEFICIENCY: ICD-10-CM

## 2019-07-01 DIAGNOSIS — R73.03 PREDIABETES: ICD-10-CM

## 2019-07-01 DIAGNOSIS — I10 ESSENTIAL HYPERTENSION: Primary | ICD-10-CM

## 2019-07-01 DIAGNOSIS — E78.2 MIXED HYPERLIPIDEMIA: ICD-10-CM

## 2019-07-01 PROCEDURE — 99214 OFFICE O/P EST MOD 30 MIN: CPT | Performed by: INTERNAL MEDICINE

## 2019-07-01 RX ORDER — ERGOCALCIFEROL 1.25 MG/1
50000 CAPSULE ORAL WEEKLY
Qty: 4 CAPSULE | Refills: 3 | Status: SHIPPED | OUTPATIENT
Start: 2019-07-01 | End: 2019-12-21

## 2019-07-01 RX ORDER — FLUTICASONE PROPIONATE 50 MCG
2 SPRAY, SUSPENSION (ML) NASAL DAILY
Qty: 1 BOTTLE | Refills: 2 | Status: SHIPPED | OUTPATIENT
Start: 2019-07-01 | End: 2020-08-28

## 2019-07-01 ASSESSMENT — ENCOUNTER SYMPTOMS
BLOOD IN STOOL: 0
SORE THROAT: 0
CONSTIPATION: 0
DIARRHEA: 0
VOMITING: 0
COUGH: 0
NAUSEA: 0
WHEEZING: 0
SINUS PRESSURE: 0
RHINORRHEA: 0
CHEST TIGHTNESS: 0
ABDOMINAL PAIN: 0
SHORTNESS OF BREATH: 0
SINUS PAIN: 0

## 2019-07-01 NOTE — PATIENT INSTRUCTIONS
1. Essential hypertension  - Blood pressure is high today  - Patient declines medication  -continue to monitor blood pressure  -Low sodium diet  -Regular aerobic exercise    2. Mixed hyperlipidemia  -Continue same medications  -Low fat, low cholesterol diet  -Regular aerobic exercise    3. Primary insomnia  -stable  -Continue same medications  - Drug Panel-PM-HI Res-UR Interp-A    4. Anxiety  -stable  -Continue same medications  - Drug Panel-PM-HI Res-UR Interp-A    5. Prediabetes  -Low carbohydrate diet  -Regular aerobic exercise    6. Vitamin D deficiency  - stable  - Continue vitamin D (ERGOCALCIFEROL) 61019 units CAPS capsule; Take 1 capsule by mouth once a week  Dispense: 4 capsule; Refill: 3    7. Allergic rhinitis, unspecified seasonality, unspecified trigger  - fluticasone (FLONASE) 50 MCG/ACT nasal spray; 2 sprays by Nasal route daily  Dispense: 1 Bottle;  Refill: 2

## 2019-07-01 NOTE — PROGRESS NOTES
Neurological: Negative for dizziness, tremors, syncope, weakness, light-headedness, numbness and headaches. Psychiatric/Behavioral: Positive for sleep disturbance. Negative for decreased concentration and dysphoric mood. The patient is nervous/anxious. No Known Allergies  Outpatient Medications Marked as Taking for the 7/1/19 encounter (Office Visit) with Roanne Sacks, MD   Medication Sig Dispense Refill    vitamin D (ERGOCALCIFEROL) 77103 units CAPS capsule Take 1 capsule by mouth once a week 4 capsule 3    atorvastatin (LIPITOR) 20 MG tablet Take 1 tablet by mouth daily 30 tablet 5    valACYclovir (VALTREX) 500 MG tablet TAKE 1 TABLET BY MOUTH TWICE DAILY FOR 5 DAYS 10 tablet 3    clonazePAM (KLONOPIN) 1 MG tablet Take 1/2- 1 tablet nightly as needed for insomnia and 1/2 tablet once daily as needed for anxiety 30 tablet 2    hydrOXYzine (VISTARIL) 25 MG capsule Take 1-2 capsules nightly as needed 60 capsule 0    acyclovir (ZOVIRAX) 5 % ointment Apply topically every 3 hours for 7 days with outbreaks 15 g 2    latanoprost (XALATAN) 0.005 % ophthalmic solution Place 1 drop into both eyes nightly   0         Vitals:    07/01/19 0847 07/01/19 0854 07/01/19 0918   BP: (!) 150/100 (!) 150/100 (!) 142/96   Site: Right Upper Arm Left Upper Arm Right Upper Arm   Position: Sitting Sitting    Cuff Size: Medium Adult Medium Adult Medium Adult   Pulse: 80     Resp: 16     Temp: 98.1 °F (36.7 °C)     TempSrc: Oral     SpO2: 97%     Weight: 132 lb 6.4 oz (60.1 kg)     Height: 5' 1.5\" (1.562 m)       Body mass index is 24.61 kg/m². Physical Exam   Constitutional: She is oriented to person, place, and time. She appears well-developed and well-nourished. No distress.    HENT:   Right Ear: Tympanic membrane and ear canal normal.   Left Ear: Tympanic membrane and ear canal normal.   Mouth/Throat: Oropharynx is clear and moist and mucous membranes are normal.   Eyes: Pupils are equal, round, and reactive to high today  - Patient declines medication  -continue to monitor blood pressure  -Low sodium diet  -Regular aerobic exercise    2. Mixed hyperlipidemia  -Continue same medications  -Low fat, low cholesterol diet  -Regular aerobic exercise    3. Primary insomnia  -stable  -Continue same medications  - Drug Panel-PM-HI Res-UR Interp-A    4. Anxiety  -stable  -Continue same medications  - Drug Panel-PM-HI Res-UR Interp-A  - Patient is interested in medical marijuana. Told patient she needs to schedule a consult with a Medical Provider that is licensed to prescribe medical marijuana. 5. Prediabetes  -Low carbohydrate diet  -Regular aerobic exercise    6. Vitamin D deficiency  - stable  - Continue vitamin D (ERGOCALCIFEROL) 83209 units CAPS capsule; Take 1 capsule by mouth once a week  Dispense: 4 capsule; Refill: 3    7. Allergic rhinitis, unspecified seasonality, unspecified trigger  - fluticasone (FLONASE) 50 MCG/ACT nasal spray; 2 sprays by Nasal route daily  Dispense: 1 Bottle; Refill: 2    Discussed medications with patient, who voiced understanding of their use and indications. All questions answered. Controlled Substance Monitoring:    Acute and Chronic Pain Monitoring:   RX Monitoring 7/1/2019   Attestation -   Periodic Controlled Substance Monitoring No signs of potential drug abuse or diversion identified. ;Random urine drug screen sent today. Controlled substances monitoring: no signs of potential drug abuse or diversion identified, OARRS report reviewed today- 7/1/19, medication contract signed today and random urine drug screen sent today. .  Return in about 3 months (around 10/1/2019) for hypertension, insomnia, anxiety, hyperlipidemia, allergic rhinitis.

## 2019-07-01 NOTE — LETTER
reduced coughing, which are especially dangerous for patients with lung disease. Overdose or dangerous interactions with alcohol and other medications may occur, leading to death. Hyperalgesia may develop, in which patients receiving opioids for the treatment of pain may actually become more sensitive to certain painful stimuli, and in some cases, experience pain from ordinarily non-painful stimuli. Women between the ages of 14-53 who could become pregnant should carefully weigh the risks and benefits of opioids with their physicians, as these medications increase the risk of pregnancy complications, including miscarriage,  delivery and stillbirth. It is also possible for babies to be born addicted to opioids. Opioid dependence withdrawal symptoms may include; feelings of uneasiness, increased pain, irritability, belly pain, diarrhea, sweats and goose-flesh. Benzodiazepines and non-benzodiazepine sleep medications: These medications can lead to problems such as addiction/dependence, sedation, fatigue, lightheadedness, dizziness, incoordination, falls, depression, hallucinations, and impaired judgment, memory and concentration. The ability to drive and operate machinery may also be affected. Abnormal sleep-related behaviors have been reported, including sleep walking, driving, making telephone calls, eating, or having sex while not fully awake. These medications can suppress breathing and worsen sleep apnea, particularly when combined with alcohol or other sedating medications, potentially leading to death. Dependence withdrawal symptoms may include tremors, anxiety, hallucinations and seizures. Stimulants:  Common adverse effects include addiction/dependence, increased blood pressure and heart rate, decreased appetite, nausea, involuntary weight loss, insomnia, irritability, and headaches.   These risks may increase when these medications are combined with other

## 2019-07-05 LAB
6-ACETYLMORPHINE: NOT DETECTED
7-AMINOCLONAZEPAM: NOT DETECTED
ALPHA-OH-ALPRAZOLAM: NOT DETECTED
ALPRAZOLAM: NOT DETECTED
AMPHETAMINE: NOT DETECTED
BARBITURATES: NOT DETECTED
BENZOYLECGONINE: NOT DETECTED
BUPRENORPHINE: NOT DETECTED
CARISOPRODOL: NOT DETECTED
CLONAZEPAM: NOT DETECTED
CODEINE: NOT DETECTED
CREATININE URINE: 91.2 MG/DL (ref 20–400)
DIAZEPAM: NOT DETECTED
DRUGS EXPECTED: NORMAL
EER PAIN MGT DRUG PANEL, HIGH RES/EMIT U: NORMAL
ETHYL GLUCURONIDE: PRESENT
FENTANYL: NOT DETECTED
HYDROCODONE: NOT DETECTED
HYDROMORPHONE: NOT DETECTED
LORAZEPAM: NOT DETECTED
MARIJUANA METABOLITE: NOT DETECTED
MDA: NOT DETECTED
MDEA: NOT DETECTED
MDMA URINE: NOT DETECTED
MEPERIDINE: NOT DETECTED
METHADONE: NOT DETECTED
METHAMPHETAMINE: NOT DETECTED
METHYLPHENIDATE: NOT DETECTED
MIDAZOLAM: NOT DETECTED
MORPHINE: NOT DETECTED
NORBUPRENORPHINE, FREE: NOT DETECTED
NORDIAZEPAM: NOT DETECTED
NORFENTANYL: NOT DETECTED
NORHYDROCODONE, URINE: NOT DETECTED
NOROXYCODONE: NOT DETECTED
NOROXYMORPHONE, URINE: NOT DETECTED
OXAZEPAM: NOT DETECTED
OXYCODONE: NOT DETECTED
OXYMORPHONE: NOT DETECTED
PAIN MANAGEMENT DRUG PANEL: NORMAL
PAIN MANAGEMENT DRUG PANEL: NORMAL
PCP: NOT DETECTED
PHENTERMINE: NOT DETECTED
PROPOXYPHENE: NOT DETECTED
TAPENTADOL, URINE: NOT DETECTED
TAPENTADOL-O-SULFATE, URINE: NOT DETECTED
TEMAZEPAM: NOT DETECTED
TRAMADOL: NOT DETECTED
ZOLPIDEM: NOT DETECTED

## 2019-08-15 ENCOUNTER — TELEPHONE (OUTPATIENT)
Dept: PRIMARY CARE CLINIC | Age: 54
End: 2019-08-15

## 2019-08-19 ENCOUNTER — OFFICE VISIT (OUTPATIENT)
Dept: PRIMARY CARE CLINIC | Age: 54
End: 2019-08-19
Payer: COMMERCIAL

## 2019-08-19 VITALS
DIASTOLIC BLOOD PRESSURE: 88 MMHG | WEIGHT: 132 LBS | HEART RATE: 99 BPM | SYSTOLIC BLOOD PRESSURE: 138 MMHG | TEMPERATURE: 98.4 F | OXYGEN SATURATION: 96 % | RESPIRATION RATE: 16 BRPM | HEIGHT: 62 IN | BODY MASS INDEX: 24.29 KG/M2

## 2019-08-19 DIAGNOSIS — R51.9 TEMPORAL PAIN: Primary | ICD-10-CM

## 2019-08-19 DIAGNOSIS — R51.9 TEMPORAL PAIN: ICD-10-CM

## 2019-08-19 DIAGNOSIS — E78.2 MIXED HYPERLIPIDEMIA: ICD-10-CM

## 2019-08-19 DIAGNOSIS — I10 ESSENTIAL HYPERTENSION: ICD-10-CM

## 2019-08-19 LAB
BASOPHILS ABSOLUTE: 0 K/UL (ref 0–0.2)
BASOPHILS RELATIVE PERCENT: 0.5 %
C-REACTIVE PROTEIN: 0.5 MG/L (ref 0–5.1)
EOSINOPHILS ABSOLUTE: 0 K/UL (ref 0–0.6)
EOSINOPHILS RELATIVE PERCENT: 0.6 %
HCT VFR BLD CALC: 34.8 % (ref 36–48)
HEMOGLOBIN: 11.6 G/DL (ref 12–16)
LYMPHOCYTES ABSOLUTE: 2.1 K/UL (ref 1–5.1)
LYMPHOCYTES RELATIVE PERCENT: 34.2 %
MCH RBC QN AUTO: 31 PG (ref 26–34)
MCHC RBC AUTO-ENTMCNC: 33.2 G/DL (ref 31–36)
MCV RBC AUTO: 93.4 FL (ref 80–100)
MONOCYTES ABSOLUTE: 0.4 K/UL (ref 0–1.3)
MONOCYTES RELATIVE PERCENT: 6.2 %
NEUTROPHILS ABSOLUTE: 3.5 K/UL (ref 1.7–7.7)
NEUTROPHILS RELATIVE PERCENT: 58.5 %
PDW BLD-RTO: 13 % (ref 12.4–15.4)
PLATELET # BLD: 236 K/UL (ref 135–450)
PMV BLD AUTO: 9.4 FL (ref 5–10.5)
RBC # BLD: 3.73 M/UL (ref 4–5.2)
WBC # BLD: 6 K/UL (ref 4–11)

## 2019-08-19 PROCEDURE — 99214 OFFICE O/P EST MOD 30 MIN: CPT | Performed by: INTERNAL MEDICINE

## 2019-08-19 NOTE — PROGRESS NOTES
Subjective:      Patient ID: Ibeth Mcfarland is a 46 y.o. female. HPI  C/o sharp right temporal head ache for the last 3 weeks,   No trauma,   No redness or swelling,   No change, unrelenting, no relief. Moderate pain,   No dizziness or blurred vision no pain in the back of the head,   No trouble swallowing or eating,   No fever or chills,  Vision has been ok,   No sinus symptoms , no cold symptoms or cough or sore throat,   No cp or sob or any Nausea/vomitting/diarrhea. Essential hypertension  This is a chronic problem. The problem is well controlled. Patient monitors readings regularly. Pertinent negatives include no chest pain, focal sensory loss, focal weakness, leg pain, myalgias or shortness of breath. No headaches or chest pain. Takes medications regularly. Blood pressure has been stable, blood work was reviewed, and advised patient to continue the current instructions or medications. Hyperlipidemia  This has been a chronic problem, takes meds regularly. Monitors diet and tries to follow a low fat diet. Not been very compliant w exercise. Lipids have been stable, The problem is controlled. Recent lipid tests were reviewed and are normal. Pertinent negatives include no chest pain, focal sensory loss, focal weakness, leg pain, myalgias or shortness of breath. Advised patient to continue the current instructions or medications. Review of Systems  ROS: No unusual headaches or allergy symptoms or blurred vision. No prolonged cough. No flushing or facial pain or chest pain,dizziness, dyspnea, palpitations, or chest pain on exertion. No syncope. No nausea or vommitting or diarrhea. No jaundice or abdominal pain, change in bowel habits, black or bloody stools. No dysuria or hematuria or frequency of urination. No myalgias or muscle pain. No numbness, weakness, or tingling. No falls, or loss of consciousness. No weight loss or back pain. No falls. No paresthesias.  No joint swelling or

## 2019-08-20 LAB — SEDIMENTATION RATE, ERYTHROCYTE: 12 MM/HR (ref 0–30)

## 2019-08-21 ENCOUNTER — PATIENT MESSAGE (OUTPATIENT)
Dept: PRIMARY CARE CLINIC | Age: 54
End: 2019-08-21

## 2019-08-22 NOTE — TELEPHONE ENCOUNTER
Routed to [facility]  Please Advise    Did you discuss a biopsy   Did you discuss naproxen   No orders that I see

## 2019-08-30 ENCOUNTER — TELEPHONE (OUTPATIENT)
Dept: ADMINISTRATIVE | Age: 54
End: 2019-08-30

## 2019-08-30 DIAGNOSIS — R51.9 TEMPORAL PAIN: Primary | ICD-10-CM

## 2019-09-03 ENCOUNTER — TELEPHONE (OUTPATIENT)
Dept: PRIMARY CARE CLINIC | Age: 54
End: 2019-09-03

## 2019-10-17 ENCOUNTER — OFFICE VISIT (OUTPATIENT)
Dept: PRIMARY CARE CLINIC | Age: 54
End: 2019-10-17
Payer: COMMERCIAL

## 2019-10-17 VITALS
SYSTOLIC BLOOD PRESSURE: 160 MMHG | WEIGHT: 134.2 LBS | BODY MASS INDEX: 24.69 KG/M2 | HEIGHT: 62 IN | DIASTOLIC BLOOD PRESSURE: 92 MMHG | HEART RATE: 73 BPM | OXYGEN SATURATION: 98 %

## 2019-10-17 DIAGNOSIS — E78.2 MIXED HYPERLIPIDEMIA: ICD-10-CM

## 2019-10-17 DIAGNOSIS — F41.9 ANXIETY: ICD-10-CM

## 2019-10-17 DIAGNOSIS — R73.03 PREDIABETES: ICD-10-CM

## 2019-10-17 DIAGNOSIS — E55.9 VITAMIN D DEFICIENCY: ICD-10-CM

## 2019-10-17 DIAGNOSIS — F51.01 PRIMARY INSOMNIA: ICD-10-CM

## 2019-10-17 DIAGNOSIS — I10 ESSENTIAL HYPERTENSION: Primary | ICD-10-CM

## 2019-10-17 LAB
CHOLESTEROL, TOTAL: 224 MG/DL (ref 0–199)
HDLC SERPL-MCNC: 89 MG/DL (ref 40–60)
LDL CHOLESTEROL CALCULATED: 123 MG/DL
TRIGL SERPL-MCNC: 59 MG/DL (ref 0–150)
VLDLC SERPL CALC-MCNC: 12 MG/DL

## 2019-10-17 PROCEDURE — 99214 OFFICE O/P EST MOD 30 MIN: CPT | Performed by: INTERNAL MEDICINE

## 2019-10-21 DIAGNOSIS — F41.9 ANXIETY: ICD-10-CM

## 2019-10-21 DIAGNOSIS — F51.01 PRIMARY INSOMNIA: ICD-10-CM

## 2019-10-22 RX ORDER — CLONAZEPAM 1 MG/1
TABLET ORAL
Qty: 30 TABLET | Refills: 2 | Status: SHIPPED | OUTPATIENT
Start: 2019-10-22 | End: 2020-07-10 | Stop reason: CLARIF

## 2019-10-23 ENCOUNTER — TELEPHONE (OUTPATIENT)
Dept: PRIMARY CARE CLINIC | Age: 54
End: 2019-10-23

## 2019-10-26 ASSESSMENT — ENCOUNTER SYMPTOMS
CONSTIPATION: 0
RHINORRHEA: 0
ABDOMINAL PAIN: 0
WHEEZING: 0
SINUS PRESSURE: 0
CHEST TIGHTNESS: 0
COUGH: 0
NAUSEA: 0
VOMITING: 0
BLOOD IN STOOL: 0
DIARRHEA: 0
SORE THROAT: 0
SHORTNESS OF BREATH: 0

## 2019-11-25 DIAGNOSIS — A60.00 GENITAL HERPES SIMPLEX, UNSPECIFIED SITE: ICD-10-CM

## 2019-11-25 RX ORDER — VALACYCLOVIR HYDROCHLORIDE 500 MG/1
TABLET, FILM COATED ORAL
Qty: 10 TABLET | Refills: 2 | Status: SHIPPED | OUTPATIENT
Start: 2019-11-25 | End: 2020-08-28

## 2019-12-21 DIAGNOSIS — E55.9 VITAMIN D DEFICIENCY: ICD-10-CM

## 2019-12-21 RX ORDER — ERGOCALCIFEROL 1.25 MG/1
CAPSULE ORAL
Qty: 4 CAPSULE | Refills: 5 | Status: SHIPPED | OUTPATIENT
Start: 2019-12-21 | End: 2020-06-30

## 2020-02-24 RX ORDER — ATORVASTATIN CALCIUM 20 MG/1
TABLET, FILM COATED ORAL
Qty: 30 TABLET | Refills: 4 | Status: SHIPPED | OUTPATIENT
Start: 2020-02-24 | End: 2020-08-19

## 2020-03-30 RX ORDER — CLONAZEPAM 1 MG/1
TABLET ORAL
Qty: 30 TABLET | Refills: 2 | OUTPATIENT
Start: 2020-03-30 | End: 2020-06-30

## 2020-03-30 NOTE — TELEPHONE ENCOUNTER
Spoke with patient. Rx refill denied. Pt needs appointment because Chase Byrd is a controlled substance and she hasn't been seen since 10/17/19. Will schedule Virtual video visit for 10:00AM tomorrow 3/31/20.

## 2020-03-31 ENCOUNTER — TELEMEDICINE (OUTPATIENT)
Dept: PRIMARY CARE CLINIC | Age: 55
End: 2020-03-31
Payer: COMMERCIAL

## 2020-03-31 PROCEDURE — 99214 OFFICE O/P EST MOD 30 MIN: CPT | Performed by: INTERNAL MEDICINE

## 2020-03-31 RX ORDER — CLONAZEPAM 1 MG/1
TABLET ORAL
Qty: 45 TABLET | Refills: 2 | Status: SHIPPED | OUTPATIENT
Start: 2020-03-31 | End: 2020-08-28

## 2020-03-31 ASSESSMENT — ENCOUNTER SYMPTOMS
DIARRHEA: 0
SHORTNESS OF BREATH: 0
CONSTIPATION: 0
CHEST TIGHTNESS: 0
ABDOMINAL PAIN: 0
SINUS PRESSURE: 0
COUGH: 0
NAUSEA: 0
WHEEZING: 0
VOMITING: 0
RHINORRHEA: 0
BLOOD IN STOOL: 0
SORE THROAT: 0

## 2020-03-31 NOTE — PATIENT INSTRUCTIONS
1. Primary insomnia  - stable  - clonazePAM (KLONOPIN) 1 MG tablet; Take 1 tablet nightly for insomnia    2. Anxiety  - stable  - Continue clonazePAM (KLONOPIN) 1 MG tablet; Take 1 tablet nightly and 1 tablet once daily as needed     3. Mixed hyperlipidemia  -Continue same medications  -Low fat, low cholesterol diet  -Regular aerobic exercise  - Lipid Panel; Future  - Comprehensive Metabolic Panel; Future    4. Essential hypertension  - stable   -Patient is off blood pressure medication  -Low sodium diet  -Regular aerobic exercise  - Comprehensive Metabolic Panel; Future    5. Prediabetes  -Low carbohydrate diet  -Regular aerobic exercise  - Hemoglobin A1C; Future    6. Vitamin D deficiency  -continue same dose of vitamin D  - Vitamin D 25 Hydroxy;  Future

## 2020-03-31 NOTE — PROGRESS NOTES
3/31/2020    TELEHEALTH EVALUATION -- Audio/Visual (During VHFKW-07 public health emergency)    Chief Complaint   Patient presents with    Anxiety    Insomnia    Hyperlipidemia    Other     prediabetes and vitamin D deficiency    Hypertension       HPI:    Diomedes Hensley (:  1965) has requested an audio/video evaluation for the following concern(s): Anxiety - Pt takes Klonopin 1mg 1 tablet po qhs and 1 tablet during the day as needed. Pt denies feeling nervous and jittery. Insomnia- Pt takes Klonopin 1mg 1 tablet po qhs. Pt c/o difficulty staying asleep but is able to sleep with Klonopin. Pt denies side effects. Hyperlipidemia- Pt takes Atorvastatin 20mg po qhs. Pt decreases fat and cholesterol. Pt walks every other day or elliptical daily and does light weights. Prediabetes- Pt decreases carbohydrates. Vitamin D deficinecy- Pt takes vitamin D 50,000 IU once a week. Hypertension-Pt is off BP medication. Pt has BP checked at work and goes to [facility] for BP checks. Pt states her BP was 120/82 2 weeks ago. Pt states she cut back on salt. Pt walks every other day or elliptical daily and does light weights. Review of Systems   Constitutional: Negative for activity change, appetite change, chills, fatigue, fever and unexpected weight change. HENT: Negative for congestion, postnasal drip, rhinorrhea, sinus pressure and sore throat. Eyes: Negative for visual disturbance. Respiratory: Negative for cough, chest tightness, shortness of breath and wheezing. Cardiovascular: Negative for chest pain, palpitations and leg swelling. Gastrointestinal: Negative for abdominal pain, blood in stool, constipation, diarrhea, nausea and vomiting. Genitourinary: Negative for dysuria, frequency and hematuria. Musculoskeletal: Negative for arthralgias and myalgias. Skin: Negative for rash.    Neurological: Negative for dizziness, tremors, syncope, weakness, light-headedness, numbness and headaches. Psychiatric/Behavioral: Positive for sleep disturbance. Negative for decreased concentration and dysphoric mood. The patient is nervous/anxious. Prior to Visit Medications    Medication Sig Taking? Authorizing Provider   clonazePAM (KLONOPIN) 1 MG tablet Take 1 tablet nightly for insomnia and 1 tablet once daily as needed for anxiety Yes Jake Wheeler MD   atorvastatin (LIPITOR) 20 MG tablet TAKE ONE TABLET BY MOUTH DAILY  Jake Wheeler MD   vitamin D (ERGOCALCIFEROL) 1.25 MG (26226 UT) CAPS capsule TAKE ONE CAPSULE BY MOUTH ONCE WEEKLY  Jake Wheeler MD   valACYclovir (VALTREX) 500 MG tablet TAKE ONE TABLET BY MOUTH TWICE A DAY FOR 5 DAYS  Jake Wheeler MD   clonazePAM (KLONOPIN) 1 MG tablet TAKE ONE- HALF (1/2) TO 1 TABLET BY MOUTH ONCE NIGHTLY AS NEEDED FOR INSOMNIA AND TAKE ONE-HALF TABLET BY MOUTH DAILY AS NEEDED FOR ANXIETY  Jake Wheeler MD   fluticasone (FLONASE) 50 MCG/ACT nasal spray 2 sprays by Nasal route daily  Jake Wheeler MD   hydrOXYzine (VISTARIL) 25 MG capsule Take 1-2 capsules nightly as needed  Jake Wheeler MD   acyclovir (ZOVIRAX) 5 % ointment Apply topically every 3 hours for 7 days with outbreaks  Jake Wheeler MD   latanoprost (XALATAN) 0.005 % ophthalmic solution Place 1 drop into both eyes nightly   Historical Provider, MD       Social History     Tobacco Use    Smoking status: Never Smoker    Smokeless tobacco: Never Used   Substance Use Topics    Alcohol use: Yes     Alcohol/week: 6.0 standard drinks     Types: 6 Glasses of wine per week    Drug use:  No            PHYSICAL EXAMINATION:  [ INSTRUCTIONS:  \"[x]\" Indicates a positive item  \"[]\" Indicates a negative item  -- DELETE ALL ITEMS NOT EXAMINED]  Vital Signs: (As obtained by patient/caregiver or practitioner observation)    Blood pressure-  Heart rate-    Respiratory rate-    Temperature-  Pulse oximetry-     Constitutional: [x] Appears well-developed and Prediabetes  -Low carbohydrate diet  -Regular aerobic exercise  - Hemoglobin A1C; Future    6. Vitamin D deficiency  -continue same dose of vitamin D  - Vitamin D 25 Hydroxy; Future        Controlled Substance Monitoring:    Acute and Chronic Pain Monitoring:   RX Monitoring 3/31/2020   Attestation -   Periodic Controlled Substance Monitoring No signs of potential drug abuse or diversion identified. Return in about 3 months (around 6/30/2020) for annual physical exam.    An  electronic signature was used to authenticate this note. --Liseth Bhandari MD on 3/31/2020 at 6:58 PM        Pursuant to the emergency declaration under the [facility], [facility] waiver authority and the [employer] Act, this Virtual  Visit was conducted, with patient's consent, to reduce the patient's risk of exposure to COVID-19 and provide continuity of care for an established patient. Services were provided through a video synchronous discussion virtually to substitute for in-person clinic visit.

## 2020-06-01 ENCOUNTER — TELEPHONE (OUTPATIENT)
Dept: PHARMACY | Facility: CLINIC | Age: 55
End: 2020-06-01

## 2020-06-30 RX ORDER — ERGOCALCIFEROL 1.25 MG/1
CAPSULE ORAL
Qty: 4 CAPSULE | Refills: 4 | Status: SHIPPED | OUTPATIENT
Start: 2020-06-30 | End: 2020-11-09

## 2020-06-30 NOTE — TELEPHONE ENCOUNTER
Medication:   Requested Prescriptions     Pending Prescriptions Disp Refills    vitamin D (ERGOCALCIFEROL) 1.25 MG (51788 UT) CAPS capsule [Pharmacy Med Name: VIT D2 (ERGOCAL) 1.25MG(50,000U) CP] 4 capsule 4     Sig: TAKE ONE CAPSULE BY MOUTH ONCE WEEKLY        Last Filled:      Patient Phone Number: [phone] (home)     Last appt: 10/17/2019 03-31-20  Next appt: Visit date not found    Last OARRS:   RX Monitoring 3/31/2020   Attestation -   Periodic Controlled Substance Monitoring No signs of potential drug abuse or diversion identified.        Preferred Pharmacy:   [facility] [address] [facility] P [phone] - F [phone]  Geovani S[address]ey [address] [facility] [address] [facility]  Phone: [phone] Fax: [phone]    [facility], [address] [phone] - F [phone]  [address] [place] [address]  Phone: [phone] Fax: [phone] [facility] [address] [facility], [address] [facility] Tammie Pires [phone]  [address] [place] [address]  Phone: [phone] Fax: [phone]

## 2020-07-07 ENCOUNTER — TELEPHONE (OUTPATIENT)
Dept: PRIMARY CARE CLINIC | Age: 55
End: 2020-07-07

## 2020-07-10 ENCOUNTER — OFFICE VISIT (OUTPATIENT)
Dept: PRIMARY CARE CLINIC | Age: 55
End: 2020-07-10
Payer: COMMERCIAL

## 2020-07-10 VITALS
WEIGHT: 137 LBS | OXYGEN SATURATION: 96 % | DIASTOLIC BLOOD PRESSURE: 90 MMHG | RESPIRATION RATE: 16 BRPM | BODY MASS INDEX: 25.47 KG/M2 | HEART RATE: 81 BPM | SYSTOLIC BLOOD PRESSURE: 142 MMHG | TEMPERATURE: 98 F

## 2020-07-10 DIAGNOSIS — R51.9 FREQUENT HEADACHES: ICD-10-CM

## 2020-07-10 PROCEDURE — 99214 OFFICE O/P EST MOD 30 MIN: CPT | Performed by: INTERNAL MEDICINE

## 2020-07-10 PROCEDURE — 1036F TOBACCO NON-USER: CPT | Performed by: INTERNAL MEDICINE

## 2020-07-10 PROCEDURE — G8427 DOCREV CUR MEDS BY ELIG CLIN: HCPCS | Performed by: INTERNAL MEDICINE

## 2020-07-10 PROCEDURE — G8419 CALC BMI OUT NRM PARAM NOF/U: HCPCS | Performed by: INTERNAL MEDICINE

## 2020-07-10 PROCEDURE — 3017F COLORECTAL CA SCREEN DOC REV: CPT | Performed by: INTERNAL MEDICINE

## 2020-07-10 RX ORDER — PANTOPRAZOLE SODIUM 40 MG/1
40 GRANULE, DELAYED RELEASE ORAL 2 TIMES DAILY
COMMUNITY
End: 2020-08-28

## 2020-07-10 RX ORDER — PROPRANOLOL HCL 60 MG
60 CAPSULE, EXTENDED RELEASE 24HR ORAL DAILY
Qty: 30 CAPSULE | Refills: 1 | Status: SHIPPED | OUTPATIENT
Start: 2020-07-10 | End: 2020-08-28

## 2020-07-10 SDOH — ECONOMIC STABILITY: FOOD INSECURITY: WITHIN THE PAST 12 MONTHS, YOU WORRIED THAT YOUR FOOD WOULD RUN OUT BEFORE YOU GOT MONEY TO BUY MORE.: NEVER TRUE

## 2020-07-10 SDOH — ECONOMIC STABILITY: TRANSPORTATION INSECURITY
IN THE PAST 12 MONTHS, HAS LACK OF TRANSPORTATION KEPT YOU FROM MEETINGS, WORK, OR FROM GETTING THINGS NEEDED FOR DAILY LIVING?: NO

## 2020-07-10 SDOH — ECONOMIC STABILITY: TRANSPORTATION INSECURITY
IN THE PAST 12 MONTHS, HAS THE LACK OF TRANSPORTATION KEPT YOU FROM MEDICAL APPOINTMENTS OR FROM GETTING MEDICATIONS?: NO

## 2020-07-10 SDOH — ECONOMIC STABILITY: FOOD INSECURITY: WITHIN THE PAST 12 MONTHS, THE FOOD YOU BOUGHT JUST DIDN'T LAST AND YOU DIDN'T HAVE MONEY TO GET MORE.: NEVER TRUE

## 2020-07-10 SDOH — ECONOMIC STABILITY: INCOME INSECURITY: HOW HARD IS IT FOR YOU TO PAY FOR THE VERY BASICS LIKE FOOD, HOUSING, MEDICAL CARE, AND HEATING?: NOT HARD AT ALL

## 2020-07-10 ASSESSMENT — PATIENT HEALTH QUESTIONNAIRE - PHQ9
SUM OF ALL RESPONSES TO PHQ9 QUESTIONS 1 & 2: 0
1. LITTLE INTEREST OR PLEASURE IN DOING THINGS: 0
2. FEELING DOWN, DEPRESSED OR HOPELESS: 0
SUM OF ALL RESPONSES TO PHQ QUESTIONS 1-9: 0
SUM OF ALL RESPONSES TO PHQ QUESTIONS 1-9: 0

## 2020-07-10 NOTE — LETTER
MEDICATION AGREEMENT     Marcelina Hardy  [birth year]      For certain conditions, multiple classes of medications may be used to help better manage your symptoms, and to improve your ability to function at home, work and in social settings. However, these medications do have risks, which will be discussed with you, including addiction and dependency. The following prescribed medications need frequent monitoring and will require you to partner and assist in your healthcare. Medication  Dose, instructions and quantity as indicated on current prescription bottle Diagnosis/Reason(s) for Taking Category   Klonopin 1mg 1 tablet po qhs and 1/2 tablet during the day prn   Insomnia  anxiety                            Benefits and goals of Controlled Substance Medications: There are two potential goals for your treatment: (1) decreased pain and suffering (2) improved daily life functions. There are many possible treatments for your chronic condition(s), and, in addition to controlled substance medications, we will try alternatives such as physical therapy, yoga, massage, home daily exercise, meditation, relaxation techniques, injections, chiropractic manipulations, surgery, cognitive therapy, hypnosis and many medications that are not habit-forming. Use of controlled substance medications may be helpful, but they are unlikely to resolve all of your symptoms or restore all function. Risks of Controlled Substance Medications:    Opioid pain medications: These medications can lead to problems such as addiction/dependence, sedation, lightheadedness/dizziness, memory issues, falls, constipation, nausea, or vomiting. They may also impair the ability to drive or operate machinery. Additionally, these medications may lower testosterone levels, leading to loss of bone strength, stamina and sex drive.   They may cause problems with breathing, sleep apnea and reduced coughing, which are especially dangerous for patients with lung disease. Overdose or dangerous interactions with alcohol and other medications may occur, leading to death. Hyperalgesia may develop, in which patients receiving opioids for the treatment of pain may actually become more sensitive to certain painful stimuli, and in some cases, experience pain from ordinarily non-painful stimuli. Women between the ages of 14-53 who could become pregnant should carefully weigh the risks and benefits of opioids with their physicians, as these medications increase the risk of pregnancy complications, including miscarriage,  delivery and stillbirth. It is also possible for babies to be born addicted to opioids. Opioid dependence withdrawal symptoms may include; feelings of uneasiness, increased pain, irritability, belly pain, diarrhea, sweats and goose-flesh. Benzodiazepines and non-benzodiazepine sleep medications: These medications can lead to problems such as addiction/dependence, sedation, fatigue, lightheadedness, dizziness, incoordination, falls, depression, hallucinations, and impaired judgment, memory and concentration. The ability to drive and operate machinery may also be affected. Abnormal sleep-related behaviors have been reported, including sleep walking, driving, making telephone calls, eating, or having sex while not fully awake. These medications can suppress breathing and worsen sleep apnea, particularly when combined with alcohol or other sedating medications, potentially leading to death. Dependence withdrawal symptoms may include tremors, anxiety, hallucinations and seizures. Stimulants:  Common adverse effects include addiction/dependence, increased blood pressure and heart rate, decreased appetite, nausea, involuntary weight loss, insomnia, irritability, and headaches.   These risks may increase when these medications are combined with other stimulants, such as caffeine pills or energy drinks, certain weight loss supplements and oral decongestants. Dependence withdrawal symptoms may include depressed mood, loss of interest, suicidal thoughts, anxiety, fatigue, appetite changes and agitation. Testosterone replacement therapy:  Potential side effects include increased risk of stroke and heart attack, blood clots, increased blood pressure, increased cholesterol, enlarged prostate, sleep apnea, irritability/aggression and other mood disorders, and decreased fertility. Other:     1. I understand that I have the following responsibilities:  · I will take medications at the dose and frequency prescribed. · I will not increase or change how I take my medications without the approval of the health care provider who signs this Medication Agreement. · I will arrange for refills at the prescribed interval ONLY during regular office hours. I will not ask for refills earlier than agreed, after-hours, on holidays or on weekends. · I will obtain all refills for these medications at  ·  _____[address] [phone]_______________________________  pharmacy (phone number  ·  ________________________), with full consent for my provider and pharmacist to exchange information in writing or verbally. · I will not request any pain medications or controlled substances from other providers and will inform this provider of all other medications I am taking. · I will inform my other health care providers that I am taking these medications and of the existence of this [facility]. In the event of an emergency, I will provide the same information to the emergency department providers. · I will protect my prescriptions and medications. I understand that lost or misplaced prescriptions will not be replaced.   · I will keep medications only for my own use and will not share them with others. I will keep all medications away from children. · I agree to participate in any medical, psychological or psychiatric assessments recommended by my provider. · I will actively participate in any program designed to improve function, including social, physical, psychological and daily or work activities. 2. I will not use illegal or street drugs or another person's prescription. If I have an addiction problem with drugs or alcohol and my provider asks me to enter a program to address this issue, I agree to follow through. Such programs may include:  · 12-Step program and securing a sponsor  · Individual counseling   · Inpatient or outpatient treatment  · Other:_____________________________________________________________________________________________________________________________________________    If in treatment, I will request that a copy of the programs initial evaluation and treatment recommendations be sent to this provider and will not expect refills until that is received. I will also request written monthly updates be sent to this provider to verify my continuing treatment. 3. I will consent to drug screening upon my providers request to assure I am only taking the prescribed drugs, described in this MEDICATION AGREEMENT. I understand that a drug screen is a laboratory test in which a sample of my urine, blood or saliva is checked to see what drugs I have been taking. 4. I agree that I will treat the providers and staff at this office with respect at all times. I will keep all of my scheduled appointments, but if I need to cancel my appointment, I will do so a minimum of 24 hours before it is scheduled. 5. I understand that this provider may stop prescribing the medications listed if:  · I do not show any improvement in pain, or my activity has not improved. · I develop rapid tolerance or loss of improvement, as described in my treatment plan. · I develop significant side effects from the medication. · My behavior is inconsistent with the responsibilities outlined above, which may also result in my being prevented from receiving further care from this office. · Other:____________________________________________________________________    AGREEMENT:    I have read the above and have had all of my questions answered. For chronic disease management, I know that my symptoms can be managed with many types of treatments. A chronic medication trial may be part of my treatment, but I must be an active participant in my care. Medication therapy is only one part of my symptom management plan. In some cases, there may be limited scientific evidence to support the chronic use of certain medications to improve symptoms and daily function. Furthermore, in certain circumstances, there may be scientific information that suggests that use of chronic controlled substances may actually worsen my symptoms and increase my risk of unintentional death directly related to this medication therapy. I know that if my provider feels my risk from controlled medications is greater than my benefit, I will have my controlled substance medication(s) compassionately lowered or removed altogether. I agree to a controlled substance medication trial.      I further agree to allow this office to contact my HIPAA contact on file if there are concerns about my safety and use of controlled medications. I have agreed to use the following medications above as instructed by my physician and as stated in this [facility].      Patient Signature:  ______________________  Date:7/10/2020 or _____________    Provider Signature:__  ____________________  Date:7/10/2020 or _____________

## 2020-07-10 NOTE — TELEPHONE ENCOUNTER
Medication:   Requested Prescriptions     Pending Prescriptions Disp Refills    clonazePAM (KLONOPIN) 1 MG tablet [Pharmacy Med Name: clonazePAM 1 MG TABLET] 45 tablet 1     Sig: TAKE ONE TABLET BY MOUTH EVERY NIGHT FOR INSOMNIA AND TAKE ONE TABLET BY MOUTH ONCE DAILY AS NEEDED FOR ANXIETY     Last Filled: 3.31.20    Last appt: 3.31.20   Next appt: 7/10/2020    Last OARRS:   RX Monitoring 3/31/2020   Attestation -   Periodic Controlled Substance Monitoring No signs of potential drug abuse or diversion identified.

## 2020-07-10 NOTE — PROGRESS NOTES
Adwoa Lissett   Date ofBirth:  1965    Date of Visit:  7/10/2020    Chief Complaint   Patient presents with    Hypertension    Headache     Going on for 6-8 months. Lasts up to 4 days sometimes. On the left side of head feels like pulsing she states Protrusion on left lateral side forehead been there about 8 months. Wants to talk about dermatologist you reffered her to before.  Insomnia    Hyperlipidemia    Anxiety       HPI  Hypertension- Pt is not on BP medication. Pt states she is up 5 lbs and is not exercising as much since quarantine. Headache - Pt c/o headache off and on x 6-8 months. Headaches are frequent. Headache located left side of head and always in same spot 2 inches above her left ear. Headache is a pulsating and feeling like something is pulling in her head. Pt states it can last a few hours and goes away. Pt states the most headache has been the last 5 days. Pt states she hasn't taken anything for headache. Pt states the headache is not strong just annoying. Pt denies nausea, vomiting, light sensitivity, dizziness, and blurry vision. Pt c/o protrusion in her left temple. Pt states it feels like a blood vessel. Pt states it moves if she touches it. Pt states she has had it as long as she has had the the headaches. Insomnia- Pt takes Klonopin 1mg 1 tablet po qhs. Pt c/o difficulty staying asleep but is able to sleep with Klonopin. Pt denies side effects. Anxiety- Pt takes Klonopiin 1mg 1 tablet once daily as needed. Pt states her stress level is higher than normal.     Hyperlipidemia- Pt takes Atorvastatin 20mg po qhs. Pt decreases fat and cholesterol. Pt walks every other day or elliptical daily and does light weights. Cyst- Pt c/o cyst on posterior left shoulder. Pt would like a referral.     Review of Systems   Constitutional: Negative for activity change, appetite change, chills, fatigue, fever and unexpected weight change.    HENT: Negative for congestion, ear pain, postnasal drip, rhinorrhea, sinus pressure, sneezing and sore throat. Eyes: Negative for visual disturbance. Respiratory: Negative for cough, chest tightness, shortness of breath and wheezing. Cardiovascular: Negative for chest pain, palpitations and leg swelling. Gastrointestinal: Negative for abdominal pain, blood in stool, constipation, diarrhea, nausea and vomiting. Genitourinary: Negative for dysuria, frequency and hematuria. Musculoskeletal: Negative for arthralgias and myalgias. Skin: Negative for rash. Neurological: Positive for headaches. Negative for dizziness, tremors, syncope, weakness, light-headedness and numbness. Psychiatric/Behavioral: Positive for sleep disturbance. Negative for decreased concentration and dysphoric mood. The patient is nervous/anxious.         No Known Allergies  Outpatient Medications Marked as Taking for the 7/10/20 encounter (Office Visit) with Gayathri Cortez MD   Medication Sig Dispense Refill    pantoprazole sodium (PROTONIX) 40 MG PACK packet Take 40 mg by mouth 2 times daily      vitamin D (ERGOCALCIFEROL) 1.25 MG (64143 UT) CAPS capsule TAKE ONE CAPSULE BY MOUTH ONCE WEEKLY 4 capsule 4    clonazePAM (KLONOPIN) 1 MG tablet Take 1 tablet nightly for insomnia and 1 tablet once daily as needed for anxiety 45 tablet 2    atorvastatin (LIPITOR) 20 MG tablet TAKE ONE TABLET BY MOUTH DAILY 30 tablet 4    valACYclovir (VALTREX) 500 MG tablet TAKE ONE TABLET BY MOUTH TWICE A DAY FOR 5 DAYS (Patient taking differently: TAKE ONE TABLET BY MOUTH TWICE A DAY FOR 5 DAYS    PRN) 10 tablet 2    fluticasone (FLONASE) 50 MCG/ACT nasal spray 2 sprays by Nasal route daily (Patient taking differently: 2 sprays by Nasal route daily PRN) 1 Bottle 2    acyclovir (ZOVIRAX) 5 % ointment Apply topically every 3 hours for 7 days with outbreaks 15 g 2    latanoprost (XALATAN) 0.005 % ophthalmic solution Place 1 drop into both eyes nightly   0         Vitals:    07/10/20 5565 07/10/20 0845   BP: (!) 142/104 (!) 142/90   Pulse: 81    Resp: 16    Temp: 98 °F (36.7 °C)    SpO2: 96%    Weight: 137 lb (62.1 kg)      Body mass index is 25.47 kg/m². Physical Exam  Nursing note reviewed. Constitutional:       General: She is not in acute distress. Appearance: Normal appearance. She is well-developed. HENT:      Head: Normocephalic and atraumatic. Right Ear: Tympanic membrane and ear canal normal. There is impacted cerumen. Left Ear: Tympanic membrane and ear canal normal.      Mouth/Throat:      Pharynx: Oropharynx is clear. Eyes:      General: Lids are normal.      Extraocular Movements: Extraocular movements intact. Conjunctiva/sclera: Conjunctivae normal.      Pupils: Pupils are equal, round, and reactive to light. Neck:      Musculoskeletal: Neck supple. Thyroid: No thyromegaly. Vascular: No carotid bruit. Cardiovascular:      Rate and Rhythm: Normal rate and regular rhythm. Heart sounds: Normal heart sounds, S1 normal and S2 normal. No murmur. No friction rub. No gallop. Pulmonary:      Effort: Pulmonary effort is normal. No respiratory distress. Breath sounds: Normal breath sounds. No wheezing, rhonchi or rales. Abdominal:      General: Bowel sounds are normal. There is no distension. Palpations: Abdomen is soft. Tenderness: There is no abdominal tenderness. Musculoskeletal:      Right lower leg: No edema. Left lower leg: No edema. Lymphadenopathy:      Head:      Right side of head: No submandibular adenopathy. Left side of head: No submandibular adenopathy. Skin:     Comments: 1 x 0.5 cm raised cyst with black head left posterior shoulder    1 cm x 0.5 soft tissue area mass left forehead   Neurological:      Mental Status: She is alert and oriented to person, place, and time. Cranial Nerves: No cranial nerve deficit.    Psychiatric:         Mood and Affect: Mood normal.           No results found Anxiety  -stable  -Continue same medications  - Drug Panel-PM-HI Res-UR Interp-A    5. Mixed hyperlipidemia  -stable  -Continue same medications  -Low fat, low cholesterol diet  -Regular aerobic exercise    6. Skin cyst  - Referral to Donna Gleason MD, General Surgery, [facility]    7. Mass of soft tissue of face  -Referral to Donna Gleason MD, General Surgery, [facility]    8. Medication management  - Drug Panel-PM-HI Res-UR Interp-A       Controlled Substance Monitoring:    Acute and Chronic Pain Monitoring:   RX Monitoring 7/13/2020   Attestation -   Periodic Controlled Substance Monitoring No signs of potential drug abuse or diversion identified. Discussed medications with patient, who voiced understanding of their use and indications. All questions answered. Return in about 2 weeks (around 7/24/2020) for hypertension and headaches.

## 2020-07-11 LAB — SEDIMENTATION RATE, ERYTHROCYTE: 10 MM/HR (ref 0–30)

## 2020-07-13 RX ORDER — CLONAZEPAM 1 MG/1
TABLET ORAL
Qty: 45 TABLET | Refills: 1 | OUTPATIENT
Start: 2020-07-13

## 2020-07-14 NOTE — TELEPHONE ENCOUNTER
Rx refill denied. Pt has requested refill too soon. Rx last filled on 6/22/20. Controlled Substance Monitoring:    Acute and Chronic Pain Monitoring:   RX Monitoring 7/13/2020   Attestation -   Periodic Controlled Substance Monitoring No signs of potential drug abuse or diversion identified.

## 2020-07-15 LAB
6-ACETYLMORPHINE: NOT DETECTED
7-AMINOCLONAZEPAM: PRESENT
ALPHA-OH-ALPRAZOLAM: NOT DETECTED
ALPRAZOLAM: NOT DETECTED
AMPHETAMINE: NOT DETECTED
BARBITURATES: NOT DETECTED
BENZOYLECGONINE: NOT DETECTED
BUPRENORPHINE: NOT DETECTED
CARISOPRODOL: NOT DETECTED
CLONAZEPAM: NOT DETECTED
CODEINE: NOT DETECTED
CREATININE URINE: 105.8 MG/DL (ref 20–400)
DIAZEPAM: NOT DETECTED
DRUGS EXPECTED: NORMAL
EER PAIN MGT DRUG PANEL, HIGH RES/EMIT U: NORMAL
ETHYL GLUCURONIDE: PRESENT
FENTANYL: NOT DETECTED
HYDROCODONE: NOT DETECTED
HYDROMORPHONE: NOT DETECTED
LORAZEPAM: NOT DETECTED
MARIJUANA METABOLITE: PRESENT
MDA: NOT DETECTED
MDEA: NOT DETECTED
MDMA URINE: NOT DETECTED
MEPERIDINE: NOT DETECTED
METHADONE: NOT DETECTED
METHAMPHETAMINE: NOT DETECTED
METHYLPHENIDATE: NOT DETECTED
MIDAZOLAM: NOT DETECTED
MORPHINE: NOT DETECTED
NORBUPRENORPHINE, FREE: NOT DETECTED
NORDIAZEPAM: NOT DETECTED
NORFENTANYL: NOT DETECTED
NORHYDROCODONE, URINE: NOT DETECTED
NOROXYCODONE: NOT DETECTED
NOROXYMORPHONE, URINE: NOT DETECTED
OXAZEPAM: NOT DETECTED
OXYCODONE: NOT DETECTED
OXYMORPHONE: NOT DETECTED
PAIN MANAGEMENT DRUG PANEL: NORMAL
PAIN MANAGEMENT DRUG PANEL: NORMAL
PCP: NOT DETECTED
PHENTERMINE: NOT DETECTED
PROPOXYPHENE: NOT DETECTED
TAPENTADOL, URINE: NOT DETECTED
TAPENTADOL-O-SULFATE, URINE: NOT DETECTED
TEMAZEPAM: NOT DETECTED
TRAMADOL: NOT DETECTED
ZOLPIDEM: NOT DETECTED

## 2020-07-17 ASSESSMENT — ENCOUNTER SYMPTOMS
WHEEZING: 0
RHINORRHEA: 0
COUGH: 0
DIARRHEA: 0
SINUS PRESSURE: 0
VOMITING: 0
SHORTNESS OF BREATH: 0
BLOOD IN STOOL: 0
ABDOMINAL PAIN: 0
CHEST TIGHTNESS: 0
NAUSEA: 0
CONSTIPATION: 0
SORE THROAT: 0

## 2020-07-21 ENCOUNTER — HOSPITAL ENCOUNTER (OUTPATIENT)
Dept: CT IMAGING | Age: 55
Discharge: HOME | End: 2020-07-21
Payer: COMMERCIAL

## 2020-07-21 PROCEDURE — 70450 CT HEAD/BRAIN W/O DYE: CPT

## 2020-07-27 ENCOUNTER — OFFICE VISIT (OUTPATIENT)
Dept: PRIMARY CARE CLINIC | Age: 55
End: 2020-07-27
Payer: COMMERCIAL

## 2020-07-27 VITALS
DIASTOLIC BLOOD PRESSURE: 84 MMHG | OXYGEN SATURATION: 99 % | BODY MASS INDEX: 24.66 KG/M2 | HEART RATE: 88 BPM | SYSTOLIC BLOOD PRESSURE: 140 MMHG | RESPIRATION RATE: 16 BRPM | WEIGHT: 134 LBS | HEIGHT: 62 IN | TEMPERATURE: 97.7 F

## 2020-07-27 PROCEDURE — 3017F COLORECTAL CA SCREEN DOC REV: CPT | Performed by: INTERNAL MEDICINE

## 2020-07-27 PROCEDURE — 69210 REMOVE IMPACTED EAR WAX UNI: CPT | Performed by: INTERNAL MEDICINE

## 2020-07-27 PROCEDURE — G8427 DOCREV CUR MEDS BY ELIG CLIN: HCPCS | Performed by: INTERNAL MEDICINE

## 2020-07-27 PROCEDURE — 1036F TOBACCO NON-USER: CPT | Performed by: INTERNAL MEDICINE

## 2020-07-27 PROCEDURE — 99214 OFFICE O/P EST MOD 30 MIN: CPT | Performed by: INTERNAL MEDICINE

## 2020-07-27 PROCEDURE — G8420 CALC BMI NORM PARAMETERS: HCPCS | Performed by: INTERNAL MEDICINE

## 2020-07-27 RX ORDER — PROPRANOLOL HYDROCHLORIDE 80 MG/1
80 CAPSULE, EXTENDED RELEASE ORAL DAILY
Qty: 30 CAPSULE | Refills: 1 | Status: SHIPPED | OUTPATIENT
Start: 2020-07-27 | End: 2020-08-28

## 2020-07-27 RX ORDER — PROPRANOLOL HCL 60 MG
60 CAPSULE, EXTENDED RELEASE 24HR ORAL DAILY
Qty: 30 CAPSULE | Refills: 1 | Status: SHIPPED
Start: 2020-07-27 | End: 2020-07-27 | Stop reason: DRUGHIGH

## 2020-07-27 ASSESSMENT — ENCOUNTER SYMPTOMS
COUGH: 0
CONSTIPATION: 0
BLOOD IN STOOL: 0
CHEST TIGHTNESS: 0
SORE THROAT: 0
SHORTNESS OF BREATH: 0
VOMITING: 0
SINUS PRESSURE: 0
NAUSEA: 0
ABDOMINAL PAIN: 0
RHINORRHEA: 0
WHEEZING: 0
DIARRHEA: 0

## 2020-07-27 NOTE — PROGRESS NOTES
Maurice Waters   Date ofBirth:  1965    Date of Visit:  7/27/2020    Chief Complaint   Patient presents with    Hypertension     2 week follow up     Headache    Dizziness       HPI  Hypertension- Pt is taking Propranolol ER 60mg po q day. Pt states she hasn't monitored her BP. Pt decreases salt. Pt does elliptical for 30 minutes every morning and yoga once a week for exercise. Pt walks 3 evenings per week and does weights. Frequent Headaches- Pt states her headaches are the same. Pt has had headaches for 8 months that is nagging and pulsating on the left side. Pt denies nausea, vomiting, light sensitivity, and blurry vision. Pt had the Head CT scan done which was normal. Pt states Propranolol ER hasn't decreased the frequency. Pt hasn't taken anything for pain. Pt c/o dizziness off and on x 1 year when she is on hills with walking, switching from outdoors to indoors, and with sudden movements sometimes. Pt states she is staying hydrated. Review of Systems   Constitutional: Negative for activity change, appetite change, chills, fatigue, fever and unexpected weight change. HENT: Negative for congestion, ear pain, postnasal drip, rhinorrhea, sinus pressure, sneezing and sore throat. Eyes: Negative for visual disturbance. Respiratory: Negative for cough, chest tightness, shortness of breath and wheezing. Cardiovascular: Negative for chest pain, palpitations and leg swelling. Gastrointestinal: Negative for abdominal pain, blood in stool, constipation, diarrhea, nausea and vomiting. Genitourinary: Negative for dysuria, frequency and hematuria. Musculoskeletal: Negative for arthralgias and myalgias. Skin: Negative for rash. Neurological: Positive for dizziness and headaches. Negative for tremors, syncope, weakness, light-headedness and numbness. Psychiatric/Behavioral: Positive for sleep disturbance (stable on Klonopin). Negative for decreased concentration and dysphoric mood. The patient is nervous/anxious (stable on Klonopin). No Known Allergies  Outpatient Medications Marked as Taking for the 7/27/20 encounter (Office Visit) with Catherine Moon MD   Medication Sig Dispense Refill    pantoprazole sodium (PROTONIX) 40 MG PACK packet Take 40 mg by mouth 2 times daily      propranolol (INDERAL LA) 60 MG extended release capsule Take 1 capsule by mouth daily 30 capsule 1    vitamin D (ERGOCALCIFEROL) 1.25 MG (01040 UT) CAPS capsule TAKE ONE CAPSULE BY MOUTH ONCE WEEKLY 4 capsule 4    clonazePAM (KLONOPIN) 1 MG tablet Take 1 tablet nightly for insomnia and 1 tablet once daily as needed for anxiety 45 tablet 2    atorvastatin (LIPITOR) 20 MG tablet TAKE ONE TABLET BY MOUTH DAILY 30 tablet 4    valACYclovir (VALTREX) 500 MG tablet TAKE ONE TABLET BY MOUTH TWICE A DAY FOR 5 DAYS (Patient taking differently: TAKE ONE TABLET BY MOUTH TWICE A DAY FOR 5 DAYS    PRN) 10 tablet 2    fluticasone (FLONASE) 50 MCG/ACT nasal spray 2 sprays by Nasal route daily (Patient taking differently: 2 sprays by Nasal route daily PRN) 1 Bottle 2    acyclovir (ZOVIRAX) 5 % ointment Apply topically every 3 hours for 7 days with outbreaks 15 g 2    latanoprost (XALATAN) 0.005 % ophthalmic solution Place 1 drop into both eyes nightly   0         Vitals:    07/27/20 0905 07/27/20 0908   BP: (!) 142/88 (!) 140/84   Pulse: 88    Resp: 16    Temp: 97.7 °F (36.5 °C)    SpO2: 99%    Weight: 134 lb (60.8 kg)    Height: 5' 1.5\" (1.562 m)      Body mass index is 24.91 kg/m². Physical Exam  Nursing note reviewed. Constitutional:       General: She is not in acute distress. Appearance: Normal appearance. She is well-developed. HENT:      Right Ear: Tympanic membrane and ear canal normal. There is impacted cerumen. Left Ear: Tympanic membrane and ear canal normal. There is impacted cerumen. Mouth/Throat:      Pharynx: Oropharynx is clear.    Eyes:      General: Lids are normal. Extraocular Movements: Extraocular movements intact. Conjunctiva/sclera: Conjunctivae normal.      Pupils: Pupils are equal, round, and reactive to light. Neck:      Musculoskeletal: Neck supple. Thyroid: No thyromegaly. Vascular: No carotid bruit. Cardiovascular:      Rate and Rhythm: Normal rate and regular rhythm. Heart sounds: Normal heart sounds, S1 normal and S2 normal. No murmur. No friction rub. No gallop. Pulmonary:      Effort: Pulmonary effort is normal. No respiratory distress. Breath sounds: Normal breath sounds. No wheezing, rhonchi or rales. Abdominal:      General: Bowel sounds are normal. There is no distension. Palpations: Abdomen is soft. Tenderness: There is no abdominal tenderness. Musculoskeletal:      Right lower leg: No edema. Left lower leg: No edema. Lymphadenopathy:      Head:      Right side of head: No submandibular adenopathy. Left side of head: No submandibular adenopathy. Neurological:      Mental Status: She is alert and oriented to person, place, and time. Cranial Nerves: No cranial nerve deficit. Psychiatric:         Mood and Affect: Mood normal.           No results found for this visit on 07/27/20.   Lab Review   Orders Only on 07/10/2020   Component Date Value    Sed Rate 07/10/2020 10    Office Visit on 07/10/2020   Component Date Value    Drugs Expected 07/10/2020 see attachment     Pain Management Drug Pan* 07/10/2020 See Note     Creatinine, Ur 07/10/2020 105.8     Codeine 07/10/2020 Not Detected     Morphine 07/10/2020 Not Detected     6-Acetylmorphine 07/10/2020 Not Detected     Oxycodone 07/10/2020 Not Detected     Noroxycodone 07/10/2020 Not Detected     Oxymorphone 07/10/2020 Not Detected     NOROXYMORPHONE, URINE 07/10/2020 Not Detected     Hydrocodone 07/10/2020 Not Detected     NORHYDROCODONE, URINE 07/10/2020 Not Detected     Hydromorphone 07/10/2020 Not Detected     Buprenorphine 07/10/2020 Not Detected     Norbuprenorphine 07/10/2020 Not Detected     Fentanyl 07/10/2020 Not Detected     Norfentanyl 07/10/2020 Not Detected     Meperidine 07/10/2020 Not Detected     Tapentadol, Urine 07/10/2020 Not Detected     Tapentadol-O-Sulfate, Ur* 07/10/2020 Not Detected     Methadone 07/10/2020 Not Detected     Propoxyphene 07/10/2020 Not Detected     Tramadol 07/10/2020 Not Detected     Amphetamine 07/10/2020 Not Detected     Methamphetamine 07/10/2020 Not Detected     MDMA, Urine 07/10/2020 Not Detected     MDA 07/10/2020 Not Detected     MDEA 07/10/2020 Not Detected     Methylphenidate 07/10/2020 Not Detected     Phentermine 07/10/2020 Not Detected     Benzoylecgonine 07/10/2020 Not Detected     Alprazolam 07/10/2020 Not Detected     Alpha-OH-alprazolam 07/10/2020 Not Detected     Clonazepam 07/10/2020 Not Detected     7-aminoclonazepam 07/10/2020 Present     Diazepam 07/10/2020 Not Detected     NORDIAZEPAM 07/10/2020 Not Detected     OXAZEPAM 07/10/2020 Not Detected     TEMAZEPAM 07/10/2020 Not Detected     Lorazepam 07/10/2020 Not Detected     Midazolam 07/10/2020 Not Detected     Zolpidem 07/10/2020 Not Detected     Barbiturates 07/10/2020 Not Detected     Ethyl Glucuronide 07/10/2020 Present     Marijuana Metabolite 07/10/2020 Present     PCP 07/10/2020 Not Detected     CARISOPRODOL 07/10/2020 Not Detected     Pain Management Drug Pan* 07/10/2020 See Below     [facility]gt Drug Panel,* 07/10/2020 See Note    Orders Only on 05/19/2020   Component Date Value    Vit D, 25-Hydroxy 05/19/2020 78    Orders Only on 05/19/2020   Component Date Value    Hemoglobin A1C 05/19/2020 5.4    Orders Only on 05/19/2020   Component Date Value    Glucose 05/19/2020 100*    BUN 05/19/2020 15     CREATININE 05/19/2020 0.99     GFR, Estimated 05/19/2020 64     GFR  05/19/2020 74     BUN/Creatinine Ratio 17/32/1190 NOT APPLICABLE     Sodium 40/96/7019 142  Potassium 05/19/2020 4.1     Chloride 05/19/2020 108     CO2 05/19/2020 26     Calcium 05/19/2020 9.6     Total Protein 05/19/2020 7.0     Alb 05/19/2020 4.6     Globulin 05/19/2020 2.4     Albumin/Globulin Ratio 05/19/2020 1.9     Total Bilirubin 05/19/2020 0.8     Alkaline Phosphatase 05/19/2020 56     AST 05/19/2020 22     ALT 05/19/2020 18    Orders Only on 05/19/2020   Component Date Value    Cholesterol non HDL 05/19/2020 132*   Orders Only on 05/19/2020   Component Date Value    Chol/HDL Ratio 05/19/2020 2.5    Orders Only on 05/19/2020   Component Date Value    LDL Calculated 05/19/2020 105*   Orders Only on 05/19/2020   Component Date Value    Triglycerides 05/19/2020 153*   Orders Only on 05/19/2020   Component Date Value    HDL 05/19/2020 87    There may be more visits with results that are not included. EXAM: CT HEAD WO CONTRAST. DATE: 7/21/2020 8:50 AM.         INDICATION: Frequent headaches. COMPARISON: None    TECHNIQUE: Axial CT imaging obtained of the head. Axial images and multiplanar reformatted images are provided for review. Up-to-date CT equipment and radiation dose reduction techniques were employed. CONTRAST: None.         FINDINGS:         No acute intracranial hemorrhage, mass effect, or extra axial collection. Normal ventricular and sulcal size. The gray-white differentiation is intact.         No acute calvarial abnormalities. The orbits are normal. The paranasal sinuses and mastoid air cells are clear.              Impression         No acute intracranial hemorrhage or mass effect.                         Assessment/Plan     1. Essential hypertension  - improved   - Increase propranolol (INDERAL LA) 80 MG extended release capsule; Take 1 capsule by mouth daily  Dispense: 30 capsule; Refill: 1  -Low sodium diet  -Regular aerobic exercise    2.  Frequent headaches  -Head CT scan is normal  -no improvement  -Referral to JUDY - Mirtha Torres MD, Neurology, [facility]    3. Dizziness  - for  1 year  -recent labs unremarkable for dizziness  - Referral to Starleen Duverney, MD, Neurology, [facility]    4. Bilateral impacted cerumen  - NE REMOVAL IMPACTED CERUMEN INSTRUMENTATION UNILAT  - Ear wax removed by Medical Assistant by soaking with peroxide for 5 minutes and then wax flushed out with an ear wash spray bottle  - TM's negative after ear wax removed      Discussed medications with patient, who voiced understanding of their use and indications. All questions answered. Return in about 3 months (around 10/16/2020) for annual physical exam as previously scheduled.

## 2020-07-27 NOTE — PATIENT INSTRUCTIONS
1. Essential hypertension  - improved   - Increase propranolol (INDERAL LA) 80 MG extended release capsule; Take 1 capsule by mouth daily  Dispense: 30 capsule; Refill: 1  -Low sodium diet  -Regular aerobic exercise    2. Frequent headaches  -Head CT scan is normal  -no improvement  -Referral to JUDY Gtz MD, Neurology, [facility]    3. Dizziness  - for 1 year  -recent labs unremarkable for dizziness  - Referral to Mayur Chaidez MD, Neurology, [facility]    4.  Bilateral impacted cerumen  - MA REMOVAL IMPACTED CERUMEN INSTRUMENTATION UNILAT  - Ear wax removed by Medical Assistant by soaking with peroxide for 5 minutes and then wax flushed out with an ear wash spray bottle  - TM's negative after ear wax removed

## 2020-08-05 ENCOUNTER — TELEPHONE (OUTPATIENT)
Dept: PRIMARY CARE CLINIC | Age: 55
End: 2020-08-05

## 2020-08-05 NOTE — TELEPHONE ENCOUNTER
----- Message from Adrian Wiggins sent at 8/5/2020 11:25 AM EDT -----  Subject: Message to Provider    QUESTIONS  Information for Provider? Pt BP readings 8/4 151/105 morning 70/50   evening' 8/5 158/95 morning 160/105  ---------------------------------------------------------------------------  --------------  CALL BACK INFO  What is the best way for the office to contact you? OK to leave message on   voicemail  Preferred Call Back Phone Number? [phone]  ---------------------------------------------------------------------------  --------------  SCRIPT ANSWERS  Relationship to Patient?  Self

## 2020-08-07 ENCOUNTER — TELEPHONE (OUTPATIENT)
Dept: PRIMARY CARE CLINIC | Age: 55
End: 2020-08-07

## 2020-08-07 NOTE — TELEPHONE ENCOUNTER
Spoke with Dr. Chinmay Miranda. She wants patient to hold propanolol ER 80mg for the weekend. Monitor BP this weekend, scheduled a BP check for 8-10-20. Advised patient on these recommendations and to bring home BP cuff with her to appointment.

## 2020-08-07 NOTE — TELEPHONE ENCOUNTER
----- Message from Kurt Glasgow sent at 8/7/2020 10:01 AM EDT -----  Subject: Message to Provider    QUESTIONS  Information for Provider? pt is scared to take the medication again bc of   how low her bp was last night and mentioned might need a different med   please see previous msg w the readings and call pt to further advise   ---------------------------------------------------------------------------  --------------  CALL BACK INFO  What is the best way for the office to contact you? OK to leave message on   voicemail  Preferred Call Back Phone Number? [phone]  ---------------------------------------------------------------------------  --------------  SCRIPT ANSWERS  Relationship to Patient?  Self

## 2020-08-10 ENCOUNTER — NURSE ONLY (OUTPATIENT)
Dept: PRIMARY CARE CLINIC | Age: 55
End: 2020-08-10

## 2020-08-10 VITALS — SYSTOLIC BLOOD PRESSURE: 144 MMHG | DIASTOLIC BLOOD PRESSURE: 100 MMHG | HEART RATE: 90 BPM

## 2020-08-10 NOTE — TELEPHONE ENCOUNTER
Message addressed on 8/7/20. I told Willow Delacruz LPN to have patient hold Propranolol ER 80mg this weekend. Pt to monitor her BP. Pt to have a Nurse visit for a BP check on 8/10/20. Pt to bring her home BP monitor to the appointment.

## 2020-08-19 RX ORDER — ATORVASTATIN CALCIUM 20 MG/1
20 TABLET, FILM COATED ORAL NIGHTLY
Qty: 30 TABLET | Refills: 5 | Status: SHIPPED | OUTPATIENT
Start: 2020-08-19 | End: 2020-10-16

## 2020-08-19 NOTE — TELEPHONE ENCOUNTER
Medication:   Requested Prescriptions     Pending Prescriptions Disp Refills    atorvastatin (LIPITOR) 20 MG tablet [Pharmacy Med Name: ATORVASTATIN 20 MG TABLET] 30 tablet 3     Sig: TAKE ONE TABLET BY MOUTH DAILY     Last Filled: 2.24.20    Last appt: 7/27/2020   Next appt: 8/24/2020    Last OARRS:   RX Monitoring 7/13/2020   Attestation -   Periodic Controlled Substance Monitoring No signs of potential drug abuse or diversion identified.

## 2020-08-28 ENCOUNTER — OFFICE VISIT (OUTPATIENT)
Dept: SURGERY | Age: 55
End: 2020-08-28
Payer: COMMERCIAL

## 2020-08-28 VITALS
WEIGHT: 138.6 LBS | TEMPERATURE: 97.8 F | DIASTOLIC BLOOD PRESSURE: 73 MMHG | OXYGEN SATURATION: 96 % | HEART RATE: 106 BPM | SYSTOLIC BLOOD PRESSURE: 110 MMHG | BODY MASS INDEX: 25.76 KG/M2

## 2020-08-28 PROCEDURE — G8427 DOCREV CUR MEDS BY ELIG CLIN: HCPCS | Performed by: SURGERY

## 2020-08-28 PROCEDURE — G8419 CALC BMI OUT NRM PARAM NOF/U: HCPCS | Performed by: SURGERY

## 2020-08-28 PROCEDURE — 3017F COLORECTAL CA SCREEN DOC REV: CPT | Performed by: SURGERY

## 2020-08-28 PROCEDURE — 99202 OFFICE O/P NEW SF 15 MIN: CPT | Performed by: SURGERY

## 2020-08-28 PROCEDURE — 1036F TOBACCO NON-USER: CPT | Performed by: SURGERY

## 2020-08-28 RX ORDER — SIMVASTATIN 40 MG
40 TABLET ORAL NIGHTLY
COMMUNITY
End: 2020-10-16 | Stop reason: ALTCHOICE

## 2020-09-02 ENCOUNTER — TELEPHONE (OUTPATIENT)
Dept: SURGERY | Age: 55
End: 2020-09-02

## 2020-09-09 ENCOUNTER — PROCEDURE VISIT (OUTPATIENT)
Dept: SURGERY | Age: 55
End: 2020-09-09
Payer: COMMERCIAL

## 2020-09-09 VITALS — BODY MASS INDEX: 25.91 KG/M2 | HEIGHT: 62 IN | WEIGHT: 140.8 LBS | TEMPERATURE: 97.7 F

## 2020-09-09 PROCEDURE — 11402 EXC TR-EXT B9+MARG 1.1-2 CM: CPT | Performed by: SURGERY

## 2020-09-17 NOTE — PROGRESS NOTES
Sebaceous Cyst Excision Procedure Note    Pre-operative Diagnosis: Epidermal cyst    Post-operative Diagnosis: same    Locations:right upper back    Indications: Increasing in size with discomfort    Anesthesia: Lidocaine 1% without epinephrine without added sodium bicarbonate    Procedure Details   History of allergy to iodine: no    Patient informed of the risks (including bleeding and infection) and benefits of the   procedure and Verbal informed consent obtained. The lesion and surrounding area was given a sterile prep using chlorhexidine and draped in the usual sterile fashion. An incision was made over the cyst, which was dissected free of the surrounding tissue and removed. The cyst was filled with typical sebaceous material.  The wound was closed with 3-0 Vicryl using simple interrupted stitches. Antibiotic ointment and a sterile dressing applied. The specimen was not sent for pathologic examination. The patient tolerated the procedure well. EBL: Minimal     Findings:  1.5 cm sebaceous cyst    Condition:  Stable    Complications:  none. Plan:  1. Instructed to keep the wound dry and covered for 24-48h and clean thereafter. 2. Warning signs of infection were reviewed. 3. Recommended that the patient use OTC analgesics as needed for pain. 4. Return for wound check in 1 week.

## 2020-09-17 NOTE — PROGRESS NOTES
PATIENT NAME: Adriana Cee     YOB: 1965     TODAY'S DATE: 9/17/2020    Reason for Visit:  Chronic sebaceous cyst of the right posterior shoulder    Requesting Physician:  Dr. Sirena Delgado:              The patient is a 53 y.o. female with a PMHx as delineated below who presents with a long history of recurrent infection of her right shoulder. These would start with swelling erythema, and drainage. This has happened several times. Chief Complaint   Patient presents with   [address] [place] [address] Patient    Referral - General     Skin cyst, left shoulder       REVIEW OF SYSTEMS:  CONSTITUTIONAL:  negative  HEENT:  negative  RESPIRATORY:  negative  CARDIOVASCULAR:  negative  GASTROINTESTINAL:  negative except for   GENITOURINARY:  negative  HEMATOLOGIC/LYMPHATIC:  negative  NEUROLOGICAL:  negative    PMH  Past Medical History:   Diagnosis Date    Acid reflux     Allergic rhinitis     Anxiety     Essential hypertension 7/20/2018    Glaucoma     bilateral    Hyperlipidemia     borderline     Insomnia        PSH  Past Surgical History:   Procedure Laterality Date    BREAST BIOPSY Right 03/31/2016    BENIGN    BUNIONECTOMY Bilateral 2012, 2013    COLONOSCOPY  01/30/2014    Yuval Bojorquez MD   [facility], 1975    TUBAL LIGATION  2001    WISDOM TOOTH EXTRACTION Bilateral teenager    x 4       Social History  Social History     Socioeconomic History    Marital status:      Spouse name: Not on file    Number of children: Not on file    Years of education: Not on file    Highest education level: Not on file   Occupational History    Not on file   Social Needs    Financial resource strain: Not hard at all   [employer] insecurity     Worry: Never true     Inability: Never true    Transportation needs     Medical: No     Non-medical: No   Tobacco Use    Smoking status: Never Smoker    Smokeless tobacco: Never Used   Substance and Sexual Activity    Alcohol use:  Yes Alcohol/week: 6.0 standard drinks     Types: 6 Glasses of wine per week    Drug use: No    Sexual activity: Yes     Partners: Male   Lifestyle    Physical activity     Days per week: Not on file     Minutes per session: Not on file    Stress: Not on file   Relationships    Social connections     Talks on phone: Not on file     Gets together: Not on file     Attends Confucianism service: Not on file     Active member of club or organization: Not on file     Attends meetings of clubs or organizations: Not on file     Relationship status: Not on file    Intimate partner violence     Fear of current or ex partner: Not on file     Emotionally abused: Not on file     Physically abused: Not on file     Forced sexual activity: Not on file   Other Topics Concern    Not on file   Social History Narrative    Not on file       Allergy: No Known Allergies    PHYSICAL EXAM:  VITALS:  /73 (Site: Left Upper Arm, Position: Sitting, Cuff Size: Medium Adult)   Pulse 106   Temp 97.8 °F (36.6 °C) (Infrared)   Wt 138 lb 9.6 oz (62.9 kg)   SpO2 96%   BMI 25.76 kg/m²     CONSTITUTIONAL:  alert, no apparent distress and normal weight  EYES:  sclera clear  ENT:  normocepalic, without obvious abnormality  NECK:  supple, symmetrical, trachea midline and no carotid bruits  LUNGS:  clear to auscultation  CARDIOVASCULAR:  regular rate and rhythm and no murmur noted  ABDOMEN:  no scars, normal bowel sounds, soft, non-distended, non-tender, voluntary guarding absent, no masses palpated and hernia absent  MUSCULOSKELETAL:  0+ pitting edema lower extremities  NEUROLOGIC:  Mental Status Exam:  Level of Alertness:   awake  Orientation:   person, place, time  SKIN:  Over the right posterior shoulder, there is a 1 cm sebaceous cyst, without erythema. Multiple small skin openings noted. IMPRESSION/RECOMMENDATIONS:    The patient has a chronic sebaceous cyst of the right posterior shoulder.  She wishes to have this removed and as a

## 2020-09-18 ENCOUNTER — OFFICE VISIT (OUTPATIENT)
Dept: SURGERY | Age: 55
End: 2020-09-18

## 2020-09-18 VITALS
HEIGHT: 62 IN | BODY MASS INDEX: 25.47 KG/M2 | OXYGEN SATURATION: 97 % | HEART RATE: 76 BPM | TEMPERATURE: 97.9 F | SYSTOLIC BLOOD PRESSURE: 142 MMHG | WEIGHT: 138.4 LBS | DIASTOLIC BLOOD PRESSURE: 97 MMHG

## 2020-09-18 PROCEDURE — 99024 POSTOP FOLLOW-UP VISIT: CPT | Performed by: SURGERY

## 2020-09-21 RX ORDER — CLONAZEPAM 1 MG/1
TABLET ORAL
Qty: 45 TABLET | Refills: 0 | Status: SHIPPED | OUTPATIENT
Start: 2020-09-21 | End: 2021-01-04

## 2020-09-21 NOTE — TELEPHONE ENCOUNTER
Medication:   Requested Prescriptions     Pending Prescriptions Disp Refills    clonazePAM (KLONOPIN) 1 MG tablet [Pharmacy Med Name: clonazePAM 1 MG TABLET] 45 tablet 1     Sig: TAKE ONE TABLET BY MOUTH EVERY NIGHT FOR INSOMNIA AND TAKE ONE TABLET BY MOUTH ONCE DAILY AS NEEDED FOR ANXIETY     Last Filled: 3.31.20    Last appt: 7/27/2020   Next appt: 10/16/2020    Last OARRS:   RX Monitoring 7/13/2020   Attestation -   Periodic Controlled Substance Monitoring No signs of potential drug abuse or diversion identified.

## 2020-09-21 NOTE — TELEPHONE ENCOUNTER
Controlled Substance Monitoring:    Acute and Chronic Pain Monitoring:   RX Monitoring 9/21/2020   Attestation -   Periodic Controlled Substance Monitoring No signs of potential drug abuse or diversion identified.

## 2020-09-22 NOTE — PROGRESS NOTES
PATIENT NAME: Dandre Rosenbaum     YOB: 1965     TODAY'S DATE: 9/22/2020    Reason for Visit:  Post-op check    Requesting Physician:  Dr. Evangelina Holliday:              The patient is a 53 y.o. female with a PMHx as delineated below who presents for follow up without complaints. Chief Complaint   Patient presents with    Post-Op Check     Post-op       REVIEW OF SYSTEMS:  CONSTITUTIONAL:  negative  HEENT:  negative  RESPIRATORY:  negative  CARDIOVASCULAR:  negative  GASTROINTESTINAL:  negative except for   GENITOURINARY:  negative  HEMATOLOGIC/LYMPHATIC:  negative  NEUROLOGICAL:  negative    PMH  Past Medical History:   Diagnosis Date    Acid reflux     Allergic rhinitis     Anxiety     Essential hypertension 7/20/2018    Glaucoma     bilateral    Hyperlipidemia     borderline     Insomnia        PSH  Past Surgical History:   Procedure Laterality Date    BREAST BIOPSY Right 03/31/2016    BENIGN    BUNIONECTOMY Bilateral 2012, 2013    COLONOSCOPY  01/30/2014    Wilda WELSH   [address] [place] [address]    TUBAL LIGATION  2001    WISDOM TOOTH EXTRACTION Bilateral teenager    x 4       Social History  Social History     Socioeconomic History    Marital status:      Spouse name: Not on file    Number of children: Not on file    Years of education: Not on file    Highest education level: Not on file   Occupational History    Not on file   Social Needs    Financial resource strain: Not hard at all   [employer] insecurity     Worry: Never true     Inability: Never true    Transportation needs     Medical: No     Non-medical: No   Tobacco Use    Smoking status: Never Smoker    Smokeless tobacco: Never Used   Substance and Sexual Activity    Alcohol use:  Yes     Alcohol/week: 6.0 standard drinks     Types: 6 Glasses of wine per week    Drug use: No    Sexual activity: Yes     Partners: Male   Lifestyle    Physical activity     Days per week: Not on file Minutes per session: Not on file    Stress: Not on file   Relationships    Social connections     Talks on phone: Not on file     Gets together: Not on file     Attends Druze service: Not on file     Active member of club or organization: Not on file     Attends meetings of clubs or organizations: Not on file     Relationship status: Not on file    Intimate partner violence     Fear of current or ex partner: Not on file     Emotionally abused: Not on file     Physically abused: Not on file     Forced sexual activity: Not on file   Other Topics Concern    Not on file   Social History Narrative    Not on file       Allergy: No Known Allergies    PHYSICAL EXAM:  VITALS:  BP (!) 142/97 (Site: Right Upper Arm, Position: Sitting, Cuff Size: Medium Adult)   Pulse 76   Temp 97.9 °F (36.6 °C) (Infrared)   Ht 5' 1.5\" (1.562 m)   Wt 138 lb 6.4 oz (62.8 kg)   SpO2 97%   BMI 25.73 kg/m²     CONSTITUTIONAL:  alert, no apparent distress and normal weight  EYES:  sclera clear  ENT:  normocepalic, without obvious abnormality  NECK:  supple, symmetrical, trachea midline and no carotid bruits  LUNGS:  clear to auscultation  CARDIOVASCULAR:  regular rate and rhythm and no murmur noted  ABDOMEN:  no scars, normal bowel sounds, soft, non-distended, non-tender, voluntary guarding absent, no masses palpated and hernia absent  MUSCULOSKELETAL:  0+ pitting edema lower extremities  NEUROLOGIC:  Mental Status Exam:  Level of Alertness:   awake  Orientation:   person, place, time  SKIN:  Over the right posterior shoulder, the incision is healing well without erythema or seroma    IMPRESSION/RECOMMENDATIONS:    The patient is s/p excision of a sebaceous cyst. The patient is without complaints and the wound is healing well. We discussed her wound care and I will see her back on a PRN basis.     Pepe Bustillo

## 2020-10-16 ENCOUNTER — OFFICE VISIT (OUTPATIENT)
Dept: PRIMARY CARE CLINIC | Age: 55
End: 2020-10-16
Payer: COMMERCIAL

## 2020-10-16 VITALS
TEMPERATURE: 97.7 F | WEIGHT: 140.2 LBS | DIASTOLIC BLOOD PRESSURE: 78 MMHG | RESPIRATION RATE: 16 BRPM | HEART RATE: 81 BPM | HEIGHT: 62 IN | SYSTOLIC BLOOD PRESSURE: 118 MMHG | OXYGEN SATURATION: 98 % | BODY MASS INDEX: 25.8 KG/M2

## 2020-10-16 DIAGNOSIS — E78.2 MIXED HYPERLIPIDEMIA: ICD-10-CM

## 2020-10-16 DIAGNOSIS — E55.9 VITAMIN D DEFICIENCY: ICD-10-CM

## 2020-10-16 DIAGNOSIS — Z00.00 ANNUAL PHYSICAL EXAM: ICD-10-CM

## 2020-10-16 DIAGNOSIS — R73.03 PREDIABETES: ICD-10-CM

## 2020-10-16 LAB
A/G RATIO: 1.8 (ref 1.1–2.2)
ALBUMIN SERPL-MCNC: 4.3 G/DL (ref 3.4–5)
ALP BLD-CCNC: 70 U/L (ref 40–129)
ALT SERPL-CCNC: 11 U/L (ref 10–40)
ANION GAP SERPL CALCULATED.3IONS-SCNC: 12 MMOL/L (ref 3–16)
AST SERPL-CCNC: 18 U/L (ref 15–37)
BASOPHILS ABSOLUTE: 0 K/UL (ref 0–0.2)
BASOPHILS RELATIVE PERCENT: 0.5 %
BILIRUB SERPL-MCNC: 0.4 MG/DL (ref 0–1)
BUN BLDV-MCNC: 18 MG/DL (ref 7–20)
CALCIUM SERPL-MCNC: 9 MG/DL (ref 8.3–10.6)
CHLORIDE BLD-SCNC: 108 MMOL/L (ref 99–110)
CHOLESTEROL, TOTAL: 202 MG/DL (ref 0–199)
CO2: 23 MMOL/L (ref 21–32)
CREAT SERPL-MCNC: 1 MG/DL (ref 0.6–1.1)
EOSINOPHILS ABSOLUTE: 0 K/UL (ref 0–0.6)
EOSINOPHILS RELATIVE PERCENT: 0.6 %
ESTIMATED AVERAGE GLUCOSE: 128.4 MG/DL
GFR AFRICAN AMERICAN: >60
GFR NON-AFRICAN AMERICAN: 57
GLOBULIN: 2.4 G/DL
GLUCOSE BLD-MCNC: 94 MG/DL (ref 70–99)
HBA1C MFR BLD: 6.1 %
HCT VFR BLD CALC: 34.4 % (ref 36–48)
HDLC SERPL-MCNC: 80 MG/DL (ref 40–60)
HEMOGLOBIN: 11.2 G/DL (ref 12–16)
LDL CHOLESTEROL CALCULATED: 104 MG/DL
LYMPHOCYTES ABSOLUTE: 1.3 K/UL (ref 1–5.1)
LYMPHOCYTES RELATIVE PERCENT: 26.1 %
MCH RBC QN AUTO: 30.3 PG (ref 26–34)
MCHC RBC AUTO-ENTMCNC: 32.6 G/DL (ref 31–36)
MCV RBC AUTO: 93.1 FL (ref 80–100)
MONOCYTES ABSOLUTE: 0.3 K/UL (ref 0–1.3)
MONOCYTES RELATIVE PERCENT: 6 %
NEUTROPHILS ABSOLUTE: 3.4 K/UL (ref 1.7–7.7)
NEUTROPHILS RELATIVE PERCENT: 66.8 %
PDW BLD-RTO: 12.5 % (ref 12.4–15.4)
PLATELET # BLD: 227 K/UL (ref 135–450)
PMV BLD AUTO: 9.2 FL (ref 5–10.5)
POTASSIUM SERPL-SCNC: 4.1 MMOL/L (ref 3.5–5.1)
RBC # BLD: 3.7 M/UL (ref 4–5.2)
SODIUM BLD-SCNC: 143 MMOL/L (ref 136–145)
TOTAL PROTEIN: 6.7 G/DL (ref 6.4–8.2)
TRIGL SERPL-MCNC: 90 MG/DL (ref 0–150)
TSH SERPL DL<=0.05 MIU/L-ACNC: 1.2 UIU/ML (ref 0.27–4.2)
VITAMIN D 25-HYDROXY: 56.6 NG/ML
VLDLC SERPL CALC-MCNC: 18 MG/DL
WBC # BLD: 5.1 K/UL (ref 4–11)

## 2020-10-16 PROCEDURE — 99396 PREV VISIT EST AGE 40-64: CPT | Performed by: INTERNAL MEDICINE

## 2020-10-16 PROCEDURE — G8484 FLU IMMUNIZE NO ADMIN: HCPCS | Performed by: INTERNAL MEDICINE

## 2020-10-16 RX ORDER — ATORVASTATIN CALCIUM 20 MG/1
20 TABLET, FILM COATED ORAL NIGHTLY
Qty: 30 TABLET | Refills: 5 | COMMUNITY
Start: 2020-10-16 | End: 2021-03-22

## 2020-10-16 ASSESSMENT — ENCOUNTER SYMPTOMS
EYE DISCHARGE: 0
BACK PAIN: 0
EYE REDNESS: 0
APNEA: 0
VOICE CHANGE: 0
FACIAL SWELLING: 0
WHEEZING: 0
ABDOMINAL PAIN: 0
COUGH: 0
PHOTOPHOBIA: 0
ANAL BLEEDING: 0
EYE ITCHING: 0
TROUBLE SWALLOWING: 0
ABDOMINAL DISTENTION: 0
CHOKING: 0
DIARRHEA: 0
RECTAL PAIN: 0
EYE PAIN: 0
VOMITING: 0
SINUS PAIN: 0
SINUS PRESSURE: 0
RHINORRHEA: 0
SORE THROAT: 0
NAUSEA: 0
BLOOD IN STOOL: 0
CHEST TIGHTNESS: 0
CONSTIPATION: 0
SHORTNESS OF BREATH: 0

## 2020-10-16 NOTE — PROGRESS NOTES
Relation Age of Onset    Diabetes Mother     High Blood Pressure Mother     Cancer Mother         endometrial    Heart Disease Maternal Grandmother     High Blood Pressure Maternal Grandmother     Stroke Maternal Grandmother     Cancer Maternal Grandfather         throat       Immunization History   Administered Date(s) Administered    Tdap (Boostrix, Adacel) 08/17/2015       Vitals:    10/16/20 0856   BP: 118/78   Pulse: 81   Resp: 16   Temp: 97.7 °F (36.5 °C)   SpO2: 98%   Weight: 140 lb 3.2 oz (63.6 kg)   Height: 5' 1.5\" (1.562 m)     Body mass index is 26.06 kg/m². Physical Exam  Constitutional:       General: She is not in acute distress. Appearance: She is well-developed. HENT:      Head: Normocephalic and atraumatic. Right Ear: Hearing, tympanic membrane and ear canal normal.      Left Ear: Hearing, tympanic membrane and ear canal normal.      Nose: Nose normal.      Mouth/Throat:      Pharynx: Oropharynx is clear. Uvula midline. Eyes:      General: Lids are normal.      Extraocular Movements: Extraocular movements intact. Conjunctiva/sclera: Conjunctivae normal.      Pupils: Pupils are equal, round, and reactive to light. Neck:      Musculoskeletal: Neck supple. Thyroid: No thyromegaly. Vascular: No carotid bruit. Cardiovascular:      Rate and Rhythm: Normal rate and regular rhythm. Heart sounds: Normal heart sounds, S1 normal and S2 normal. No murmur. No friction rub. No gallop. Pulmonary:      Effort: Pulmonary effort is normal. No respiratory distress. Breath sounds: Normal breath sounds. No wheezing, rhonchi or rales. Abdominal:      General: Bowel sounds are normal. There is no distension. Palpations: Abdomen is soft. There is no mass. Tenderness: There is no abdominal tenderness. There is no rebound. Genitourinary:     Comments: deferred  Musculoskeletal: Normal range of motion.       Right shoulder: She exhibits normal range of motion and no tenderness. Left shoulder: She exhibits normal range of motion and no tenderness. Right elbow: She exhibits no swelling. No tenderness found. Left elbow: She exhibits no swelling. No tenderness found. Right wrist: She exhibits no tenderness and no swelling. Left wrist: She exhibits no tenderness and no swelling. Right hip: She exhibits no tenderness. Left hip: She exhibits no tenderness. Right knee: She exhibits no swelling. No tenderness found. Left knee: She exhibits no swelling. No tenderness found. Right ankle: She exhibits no swelling. No tenderness. Left ankle: She exhibits no swelling. No tenderness. Cervical back: She exhibits normal range of motion, no tenderness and no spasm. Thoracic back: She exhibits no tenderness and no spasm. Lumbar back: She exhibits normal range of motion, no tenderness and no spasm. Right upper arm: She exhibits no tenderness. Left upper arm: She exhibits no tenderness. Right hand: She exhibits no tenderness and no swelling. Left hand: She exhibits no tenderness and no swelling. Right upper leg: She exhibits no tenderness. Left upper leg: She exhibits no tenderness. Right lower leg: She exhibits no tenderness. No edema. Left lower leg: She exhibits no tenderness. No edema. Right foot: No tenderness or swelling. Left foot: No tenderness or swelling. Lymphadenopathy:      Head:      Right side of head: No submandibular adenopathy. Left side of head: No submandibular adenopathy. Cervical: No cervical adenopathy. Skin:     General: Skin is warm and dry. Findings: No bruising, erythema, lesion or rash. Neurological:      Mental Status: She is alert and oriented to person, place, and time. Cranial Nerves: No cranial nerve deficit. Gait: Gait normal.      Deep Tendon Reflexes: Reflexes are normal and symmetric.  Babinski sign absent on the right side. Babinski sign absent on the left side. Psychiatric:         Mood and Affect: Mood normal.         Speech: Speech normal.             No results found for this visit on 10/16/20. Assessment/Plan     1. Annual physical exam  - URINALYSIS  - Comprehensive Metabolic Panel; Future  - CBC Auto Differential; Future  - Lipid Panel; Future  - TSH without Reflex; Future  - Pap smear done on 9/11/19  - Mammogram done on 10/17/19 and patient given order  - Colonoscopy done on 5/22/20  - Patient declines flu shot  - Patient declines Shingrix  -Regular aerobic exercise    2. Mixed hyperlipidemia  - Continue atorvastatin (LIPITOR) 20 MG tablet; Take 1 tablet by mouth nightly  Dispense: 30 tablet; Refill: 5  -Low fat, low cholesterol diet  -Regular aerobic exercise  - Lipid Panel; Future    3. Essential hypertension  -stable  -Continue same medications  -Low sodium diet  -Regular aerobic exercise    4. Primary insomnia  -stable  -Continue same medications    5. Anxiety  -stable  -Continue same medications    6. Prediabetes  -Low carbohydrate diet  -Regular aerobic exercise  - Hemoglobin A1C; Future    7. Vitamin D deficiency  -Continue same medications  - Vitamin D 25 Hydroxy; Future    8. Encounter for screening mammogram for breast cancer  - JOHANA DIGITAL SCREEN W OR WO CAD BILATERAL; Future      Discussed medications with patient, who voiced understanding of their use and indications. All questions answered. Return in about 3 months (around 1/16/2021) for Insomnia and anxiety.

## 2020-10-16 NOTE — PATIENT INSTRUCTIONS
1. Annual physical exam  - URINALYSIS  - Comprehensive Metabolic Panel; Future  - CBC Auto Differential; Future  - Lipid Panel; Future  - TSH without Reflex; Future  - Pap smear done on 9/11/19  - Mammogram done on 10/17/19 and patient given order  - Colonoscopy done on 5/22/20  - Patient declines flu shot  - Patient declines Shingrix  -Regular aerobic exercise    2. Mixed hyperlipidemia  - Continue atorvastatin (LIPITOR) 20 MG tablet; Take 1 tablet by mouth nightly  Dispense: 30 tablet; Refill: 5  -Low fat, low cholesterol diet  -Regular aerobic exercise  - Lipid Panel; Future    3. Essential hypertension  -stable  -Continue same medications  -Low sodium diet  -Regular aerobic exercise    4. Primary insomnia  -stable  -Continue same medications    5. Anxiety  -stable  -Continue same medications    6. Prediabetes  -Low carbohydrate diet  -Regular aerobic exercise  - Hemoglobin A1C; Future    7. Vitamin D deficiency  -Continue same medications  - Vitamin D 25 Hydroxy; Future  Patient Education        Well Visit, Women 48 to 72: Care Instructions  Your Care Instructions     Physical exams can help you stay healthy. Your doctor has checked your overall health and may have suggested ways to take good care of yourself. He or she also may have recommended tests. At home, you can help prevent illness with healthy eating, regular exercise, and other steps. Follow-up care is a key part of your treatment and safety. Be sure to make and go to all appointments, and call your doctor if you are having problems. It's also a good idea to know your test results and keep a list of the medicines you take. How can you care for yourself at home? · Reach and stay at a healthy weight. This will lower your risk for many problems, such as obesity, diabetes, heart disease, and high blood pressure. · Get at least 30 minutes of exercise on most days of the week. Walking is a good choice.  You also may want to do other activities, such as running, swimming, cycling, or playing tennis or team sports. · Do not smoke. Smoking can make health problems worse. If you need help quitting, talk to your doctor about stop-smoking programs and medicines. These can increase your chances of quitting for good. · Protect your skin from too much sun. When you're outdoors from 10 a.m. to 4 p.m., stay in the shade or cover up with clothing and a hat with a wide brim. Wear sunglasses that block UV rays. Even when it's cloudy, put broad-spectrum sunscreen (SPF 30 or higher) on any exposed skin. · See a dentist one or two times a year for checkups and to have your teeth cleaned. · Wear a seat belt in the car. Follow your doctor's advice about when to have certain tests. These tests can spot problems early. · Cholesterol. Your doctor will tell you how often to have this done based on your age, family history, or other things that can increase your risk for heart attack and stroke. · Blood pressure. Have your blood pressure checked during a routine doctor visit. Your doctor will tell you how often to check your blood pressure based on your age, your blood pressure results, and other factors. · Mammogram. Ask your doctor how often you should have a mammogram, which is an X-ray of your breasts. A mammogram can spot breast cancer before it can be felt and when it is easiest to treat. · Pap test and pelvic exam. Ask your doctor how often you should have a Pap test. You may not need to have a Pap test as often as you used to. · Vision. Have your eyes checked every year or two or as often as your doctor suggests. Some experts recommend that you have yearly exams for glaucoma and other age-related eye problems starting at age 48. · Hearing. Tell your doctor if you notice any change in your hearing. You can have tests to find out how well you hear. · Diabetes. Ask your doctor whether you should have tests for diabetes. · Colorectal cancer.  Your risk for colorectal cancer gets higher as you get older. Some experts say that adults should start regular screening at age 48 and stop at age 76. Others say to start before age 48 or continue after age 76. Talk with your doctor about your risk and when to start and stop screening. · Thyroid disease. Talk to your doctor about whether to have your thyroid checked as part of a regular physical exam. Women have an increased chance of a thyroid problem. · Osteoporosis. You should begin tests for bone density at age 72. If you are younger than 72, ask your doctor whether you have factors that may increase your risk for this disease. You may want to have this test before age 72. · Heart attack and stroke risk. At least every 4 to 6 years, you should have your risk for heart attack and stroke assessed. Your doctor uses factors such as your age, blood pressure, cholesterol, and whether you smoke or have diabetes to show what your risk for a heart attack or stroke is over the next 10 years. When should you call for help? Watch closely for changes in your health, and be sure to contact your doctor if you have any problems or symptoms that concern you. Where can you learn more? Go to [email or web address]://[email or web address]. org and sign in to your [employer] account. Enter L027 in the [facility] box to learn more about \"Well Visit, Women 50 to 72: Care Instructions. \"     If you do not have an account, please click on the \"Sign Up Now\" link. Current as of: May 27, 2020               Content Version: 12.6  © 1996-4733 [employer]. Care instructions adapted under license by [facility] ([facility]). If you have questions about a medical condition or this instruction, always ask your healthcare professional. [place] [address] any warranty or liability for your use of this information.

## 2020-10-16 NOTE — LETTER
CONTROLLED SUBSTANCE MEDICATION AGREEMENT     Patient Name: Lauren Wayne  Patient YOB: 1965   I understand, that controlled substance medications may be used to help better manage my symptoms and to improve my ability to function at home, work and in social settings. However, I also understand that these medications do have risks, which have been discussed with me, including possible development of physical or psychological dependence. I understand that successful treatment requires mutual trust and honesty between me and my provider. I understand and agree that following this Medication Agreement is necessary in continuing my provider-patient relationship and the success of my treatment plan. Explanation from my Provider: Benefits and Goals of Controlled Substance Medications: There are two potential goals for your treatment: (1) decreased pain and suffering (2) improved daily life functions. There are many possible treatments for your chronic condition(s). Alternatives such as physical therapy, yoga, massage, home daily exercise, meditation, relaxation techniques, injections, chiropractic manipulations, surgery, cognitive therapy, hypnosis and many medications that are not habit-forming may be used. Use of controlled substance medications may be helpful, but they are unlikely to resolve all symptoms or restore all function. Explanation from my Provider: Risks of Controlled Substance Medications:  Opioid pain medications: These medications can lead to problems such as addiction/dependence, sedation, lightheadedness/dizziness, memory issues, falls, constipation, nausea, or vomiting. They may also impair the ability to drive or operate machinery. Additionally, these medications may lower testosterone levels, leading to loss of bone strength, stamina and sex drive.   They may cause problems with breathing, sleep apnea and reduced coughing, which is especially dangerous for patients with lung disease. Overdose or dangerous interactions with alcohol and other medications may occur, leading to death. Hyperalgesia may develop, which means patients receiving opioids for the treatment of pain may become more sensitive to certain painful stimuli, and in some cases, experience pain from ordinarily non-painful stimuli. Women between the ages of 14-53 who could become pregnant should carefully weigh the risks and benefits of opioids with their physicians, as these medications increase the risk of pregnancy complications, including miscarriage,  delivery and stillbirth. It is also possible for babies to be born addicted to opioids. Opioid dependence withdrawal symptoms may include; feelings of uneasiness, increased pain, irritability, belly pain, diarrhea, sweats and goose-flesh. Benzodiazepines and non-benzodiazepine sleep medications: These medications can lead to problems such as addiction/dependence, sedation, fatigue, lightheadedness, dizziness, incoordination, falls, depression, hallucinations, and impaired judgment, memory and concentration. The ability to drive and operate machinery may also be affected. Abnormal sleep-related behaviors have been reported, including sleepwalking, driving, making telephone calls, eating, or having sex while not fully awake. These medications can suppress breathing and worsen sleep apnea, particularly when combined with alcohol or other sedating medications, potentially leading to death. Dependence withdrawal symptoms may include tremors, anxiety, hallucinations and seizures.   Stimulants:  Common adverse effects include addiction/dependence, increased blood  pressure and heart rate, decreased appetite, nausea, involuntary weight loss, insomnia,                                                                                                                     Initials:_______ irritability, and headaches. These risks may increase when these medications are combined with other stimulants, such as caffeine pills or energy drinks, certain weight loss supplements and oral decongestants. Dependence withdrawal symptoms may include depressed mood, loss of interest, suicidal thoughts, anxiety, fatigue, appetite changes and agitation. Testosterone replacement therapy:  Potential side effects include increased risk of stroke and heart attack, blood clots, increased blood pressure, increased cholesterol, enlarged prostate, sleep apnea, irritability/aggression and other mood disorders, and decreased fertility. I agree and understand that I and my prescriber have the following rights and responsibilities regarding my treatment plan:     1. MY RIGHTS:  To be informed of my treatment and medication plan. To be an active participant in my health and wellbeing. 2. MY RESPONSIBILITY AND UNDERSTANDING FOR USE OF MEDICATIONS  ? I will take medications at the dose and frequency as directed. For my safety, I will not increase or change how I take my medications without the recommendation of my healthcare provider. ? I will actively participate in any program recommended by my provider which may improve function, including social, physical, psychological programs. ? I will not take my medications with alcohol or other drugs not prescribed to me. I understand that drinking alcohol with my medications increases the chances of side effects, including reduced breathing rate and could lead to personal injury when operating machinery. ? I understand that if I have a history of substance use disorders, including alcohol or other illicit drugs, that I may be at increased risk of addiction to my medications. ? I agree to notify my provider immediately if I should become pregnant so that my treatment plan can be adjusted.   ? I agree and understand that I shall only receive controlled substance medications from the prescriber that signed this agreement unless there is written agreement among other prescribers of controlled substances outlining the responsibility of the medications being prescribed. ? I understand that the if the controlled medication is not helping to achieve goals, the dosage may be tapered and no longer prescribed. 3. MY RESPONSIBILITY FOR COMMUNICATION / PRESCRIPTION RENEWALS  ? I agree that all controlled substance medications that I take will be prescribed only by my provider. If another healthcare provider prescribes me medication in an emergency, I will notify my provider within seventy-two (72) hours. ? I will arrange for refills at the prescribed interval ONLY during regular office hours. I will not ask for refills earlier than agreed, after-hours, on holidays or weekends. Refills may take up to 72 hours for processing and prescriptions to reach the pharmacy. ? I will inform my other health care providers that I am taking these medications and of the existence of this [place] [id number]. In the event of an emergency, I will provide the same information to the emergency department prescribers. ? I will keep my provider updated on the pharmacy I am using for controlled medication prescription filling. Initials:_______  4. MY RESPONSIBILITY FOR PROTECTING MEDICATIONS  ? I will protect my prescriptions and medications. I understand that lost or misplaced prescriptions will not be replaced. ? I will keep medications only for my own use and will not share them with others. I will keep all medications away from children. ? I agree that if my medications are adjusted or discontinued, I will properly dispose of any remaining medications. I understand that I will be required to dispose of any remaining controlled medications as, directed by my prescriber, prior to being provided with any prescriptions for other controlled medications.   Medication drop box locations can be found at: [employer][email or web address]    5. MY RESPONSIBILITY WITH ILLEGAL DRUGS   ? I will not use illegal or street drugs or another person's prescription medications not prescribed to me.   ? If there are identified addiction type symptoms, then referral to a program may be provided by my provider and I agree to follow through with this recommendation. 6. MY RESPONSIBILITY FOR COOPERATION WITH INVESTIGATIONS  ? I understand that my provider will comply with any applicable law and may discuss my use and/or possible misuse/abuse of controlled substances and alcohol, as appropriate, with any health care provider involved in my care, pharmacist, or legal authority. ? I authorize my provider and pharmacy to cooperate fully with law enforcement agencies (as permitted by law) in the investigation of any possible misuse, sale, or other diversion of my controlled substances. ? I agree to waive any applicable privilege or right of privacy or confidentiality with respect to these authorizations. 7. PROVIDERS RIGHT TO MONITOR FOR SAFETY: PRESCRIPTION MONITORING / DRUG TESTING  ? I consent to drug/toxicology screening and will submit to a drug screen upon my providers request to assure I am only taking the prescribed drugs for my safety monitoring. I understand that a drug screen is a laboratory test in which a sample of my urine, blood or saliva is checked to see what drugs I have been taking. This may entail an observed urine specimen, which means that a nurse or other health care provider may watch me provide urine, and I will cooperate if I am asked to provide an observed specimen. ? I understand that my provider will check a copy of my State Prescription Monitoring Program () Report in order to safely prescribe medications. ? Pill Counts: I consent to pill counts when requested.   I may be asked to bring all my prescribed controlled substance medications, in their original bottles, to all of my scheduled appointments. In addition, my provider may ask me to come to the practice at any time for a random pill count. 8. TERMINATION OF THIS AGREEMENT  For my safety, my prescriber has the right to stop prescribing controlled substance medications and may end this agreement. Initials:_______  ? Conditions that may result in termination of this agreement:  a. I do not show any improvement in pain, or my activity has not improved. b. I develop rapid tolerance or loss of improvement, as described in my treatment plan.  c. I develop significant side effects from the medication. d. My behavior is not consistent with the responsibilities outlined above, thereby causing safety concerns to continue prescribing controlled substance medications. e. I fail to follow the terms of this agreement. f. Other:____________________________       UNDERSTANDING THIS MEDICATION AGREEMENT:    I have read the above and have had all my questions answered. For chronic disease management, I know that my symptoms can be managed with many types of treatments. A chronic medication trial may be part of my treatment, but I must be an active participant in my care. Medication therapy is only one part of my symptom management plan. In some cases, there may be limited scientific evidence to support the chronic use of certain medications to improve symptoms and daily function. Furthermore, in certain circumstances, there may be scientific information that suggests that the use of chronic controlled substances may worsen my symptoms and increase my risk of unintentional death directly related to this medication therapy. I know that if my provider feels my risk from controlled medications is greater than my benefit, I will have my controlled substance medication(s) compassionately lowered or removed altogether. I further agree to allow this office to contact my HIPAA contact if there are concerns about my safety and use of the controlled medications. I have agreed to use the prescribed controlled substance medications to me as instructed by my provider and as stated in this Medication Agreement. My initial on each page and my signature below shows that I have read each page and I have had the opportunity to ask questions with answers provided by my provider.     Patient Name (Printed): _____________________________________  Patient Signature:  ______________________   Date: _____________    Prescriber Name (Printed): ___Zayra Stack MD________________________________  Prescriber Signature: ____  _________________  Date: _______10/16/20______

## 2020-11-09 RX ORDER — ERGOCALCIFEROL 1.25 MG/1
CAPSULE ORAL
Qty: 4 CAPSULE | Refills: 3 | Status: SHIPPED | OUTPATIENT
Start: 2020-11-09 | End: 2021-02-23

## 2020-11-09 NOTE — TELEPHONE ENCOUNTER
Medication:   Requested Prescriptions     Pending Prescriptions Disp Refills    vitamin D (ERGOCALCIFEROL) 1.25 MG (46944 UT) CAPS capsule [Pharmacy Med Name: VIT D2 (ERGOCAL) 1.25MG(50,000U) CP] 4 capsule 3     Sig: TAKE ONE CAPSULE BY MOUTH ONCE WEEKLY     Last Filled: 6.30.20    Last appt: 10/16/2020   Next appt: 1/19/2021    Last OARRS:   RX Monitoring 9/21/2020   Attestation -   Periodic Controlled Substance Monitoring No signs of potential drug abuse or diversion identified.

## 2021-01-03 DIAGNOSIS — F41.9 ANXIETY: ICD-10-CM

## 2021-01-03 DIAGNOSIS — F51.01 PRIMARY INSOMNIA: ICD-10-CM

## 2021-01-04 RX ORDER — CLONAZEPAM 1 MG/1
TABLET ORAL
Qty: 45 TABLET | Refills: 0 | Status: SHIPPED | OUTPATIENT
Start: 2021-01-04 | End: 2021-02-23

## 2021-01-04 NOTE — TELEPHONE ENCOUNTER
Medication:   Requested Prescriptions     Pending Prescriptions Disp Refills    clonazePAM (KLONOPIN) 1 MG tablet [Pharmacy Med Name: clonazePAM 1 MG TABLET] 45 tablet 0     Sig: TAKE ONE TABLET BY MOUTH EVERY NIGHT FOR INSOMNIA AND TAKE ONE TABLET BY MOUTH DAILY AS NEEDED FOR ANXIETY     Last Filled: 9.21.20    Last appt: 10/16/2020   Next appt: 1/14/2021    Last OARRS:   RX Monitoring 9/21/2020   Attestation -   Periodic Controlled Substance Monitoring No signs of potential drug abuse or diversion identified.

## 2021-01-05 NOTE — TELEPHONE ENCOUNTER
Controlled Substance Monitoring:    Acute and Chronic Pain Monitoring:   RX Monitoring 1/4/2021   Attestation -   Periodic Controlled Substance Monitoring No signs of potential drug abuse or diversion identified.

## 2021-01-07 DIAGNOSIS — A60.00 GENITAL HERPES SIMPLEX, UNSPECIFIED SITE: ICD-10-CM

## 2021-01-08 NOTE — TELEPHONE ENCOUNTER
Medication:   Requested Prescriptions     Pending Prescriptions Disp Refills    valACYclovir (VALTREX) 500 MG tablet [Pharmacy Med Name: VALACYCLOVIR  MG TABLET] 10 tablet 1     Sig: TAKE ONE TABLET BY MOUTH TWICE A DAY FOR 5 DAYS     Last Filled:  08/28/20    Last appt: 10/16/2020   Next appt: 1/14/2021    Last OARRS:   RX Monitoring 1/4/2021   Attestation -   Periodic Controlled Substance Monitoring No signs of potential drug abuse or diversion identified.

## 2021-01-10 RX ORDER — VALACYCLOVIR HYDROCHLORIDE 500 MG/1
500 TABLET, FILM COATED ORAL 2 TIMES DAILY
Qty: 10 TABLET | Refills: 2 | Status: SHIPPED | OUTPATIENT
Start: 2021-01-10 | End: 2021-01-15

## 2021-01-14 ENCOUNTER — OFFICE VISIT (OUTPATIENT)
Dept: PRIMARY CARE CLINIC | Age: 56
End: 2021-01-14
Payer: COMMERCIAL

## 2021-01-14 VITALS
SYSTOLIC BLOOD PRESSURE: 134 MMHG | BODY MASS INDEX: 25.84 KG/M2 | OXYGEN SATURATION: 99 % | TEMPERATURE: 96.5 F | DIASTOLIC BLOOD PRESSURE: 86 MMHG | HEART RATE: 76 BPM | WEIGHT: 139 LBS | RESPIRATION RATE: 16 BRPM

## 2021-01-14 DIAGNOSIS — F41.9 ANXIETY: ICD-10-CM

## 2021-01-14 DIAGNOSIS — F51.01 PRIMARY INSOMNIA: Primary | ICD-10-CM

## 2021-01-14 PROCEDURE — G8419 CALC BMI OUT NRM PARAM NOF/U: HCPCS | Performed by: INTERNAL MEDICINE

## 2021-01-14 PROCEDURE — G8427 DOCREV CUR MEDS BY ELIG CLIN: HCPCS | Performed by: INTERNAL MEDICINE

## 2021-01-14 PROCEDURE — 99213 OFFICE O/P EST LOW 20 MIN: CPT | Performed by: INTERNAL MEDICINE

## 2021-01-14 PROCEDURE — G8484 FLU IMMUNIZE NO ADMIN: HCPCS | Performed by: INTERNAL MEDICINE

## 2021-01-14 PROCEDURE — 3017F COLORECTAL CA SCREEN DOC REV: CPT | Performed by: INTERNAL MEDICINE

## 2021-01-14 PROCEDURE — 1036F TOBACCO NON-USER: CPT | Performed by: INTERNAL MEDICINE

## 2021-01-14 ASSESSMENT — PATIENT HEALTH QUESTIONNAIRE - PHQ9: SUM OF ALL RESPONSES TO PHQ QUESTIONS 1-9: 0

## 2021-01-14 ASSESSMENT — ENCOUNTER SYMPTOMS
VOMITING: 0
SHORTNESS OF BREATH: 0
NAUSEA: 0
CHEST TIGHTNESS: 0
ABDOMINAL PAIN: 0

## 2021-01-14 NOTE — PATIENT INSTRUCTIONS
1. Primary insomnia  -stable  -Continue same medications    2.  Anxiety  -stable  -Continue same medications

## 2021-01-14 NOTE — PROGRESS NOTES
Akiko Carvajal   Date ofBirth:  1965    Date of Visit:  2021    Chief Complaint   Patient presents with    Anxiety    Insomnia       HPI  Insomnia- Pt takes Klonopin 1mg 1 tablet po qhs. Pt states she is sleeping pretty good. Patient denies side effects.     Anxiety- Pt takes Klonopin 1mg 1 tablet daily as needed. Pt states her anxiety is better. Patient states her anxiety is up and down. Patient states her  lost 3 family members in 1 week due to Covid. Patient states her neighbor  from Covid. Review of Systems   Constitutional: Negative for activity change, appetite change, fatigue and unexpected weight change. Respiratory: Negative for chest tightness and shortness of breath. Cardiovascular: Negative for chest pain. Gastrointestinal: Negative for abdominal pain, nausea and vomiting. Neurological: Negative for dizziness and headaches. Psychiatric/Behavioral: Positive for sleep disturbance. Negative for decreased concentration and dysphoric mood. The patient is nervous/anxious. No Known Allergies  Outpatient Medications Marked as Taking for the 21 encounter (Office Visit) with Wendy Ramírez MD   Medication Sig Dispense Refill    valACYclovir (VALTREX) 500 MG tablet Take 1 tablet by mouth 2 times daily for 5 days For each outbreak 10 tablet 2    clonazePAM (KLONOPIN) 1 MG tablet TAKE ONE TABLET BY MOUTH EVERY NIGHT FOR INSOMNIA AND TAKE ONE TABLET BY MOUTH DAILY AS NEEDED FOR ANXIETY 45 tablet 0    vitamin D (ERGOCALCIFEROL) 1.25 MG (03553 UT) CAPS capsule TAKE ONE CAPSULE BY MOUTH ONCE WEEKLY 4 capsule 3    atorvastatin (LIPITOR) 20 MG tablet Take 1 tablet by mouth nightly 30 tablet 5    latanoprost (XALATAN) 0.005 % ophthalmic solution Place 1 drop into both eyes nightly   0         Vitals:    21 0858   BP: 134/86   Pulse: 76   Resp: 16   Temp: 96.5 °F (35.8 °C)   SpO2: 99%   Weight: 139 lb (63 kg)     Body mass index is 25.84 kg/m².      Physical Exam  Nursing note reviewed. Constitutional:       General: She is not in acute distress. Appearance: She is well-developed. Eyes:      Extraocular Movements: Extraocular movements intact. Pupils: Pupils are equal, round, and reactive to light. Neck:      Musculoskeletal: Neck supple. Thyroid: No thyromegaly. Cardiovascular:      Rate and Rhythm: Normal rate and regular rhythm. Heart sounds: Normal heart sounds, S1 normal and S2 normal. No murmur. Pulmonary:      Effort: Pulmonary effort is normal. No respiratory distress. Breath sounds: Normal breath sounds. Psychiatric:         Mood and Affect: Mood normal.           No results found for this visit on 01/14/21.   Lab Review   Orders Only on 10/16/2020   Component Date Value    Vit D, 25-Hydroxy 10/16/2020 56.6     TSH 10/16/2020 1.20     Hemoglobin A1C 10/16/2020 6.1     eAG 10/16/2020 128.4     Cholesterol, Total 10/16/2020 202*    Triglycerides 10/16/2020 90     HDL 10/16/2020 80*    LDL Calculated 10/16/2020 104*    VLDL Cholesterol Calcula* 10/16/2020 18     WBC 10/16/2020 5.1     RBC 10/16/2020 3.70*    Hemoglobin 10/16/2020 11.2*    Hematocrit 10/16/2020 34.4*    MCV 10/16/2020 93.1     MCH 10/16/2020 30.3     MCHC 10/16/2020 32.6     RDW 10/16/2020 12.5     Platelets 78/53/6082 227     MPV 10/16/2020 9.2     Neutrophils % 10/16/2020 66.8     Lymphocytes % 10/16/2020 26.1     Monocytes % 10/16/2020 6.0     Eosinophils % 10/16/2020 0.6     Basophils % 10/16/2020 0.5     Neutrophils Absolute 10/16/2020 3.4     Lymphocytes Absolute 10/16/2020 1.3     Monocytes Absolute 10/16/2020 0.3     Eosinophils Absolute 10/16/2020 0.0     Basophils Absolute 10/16/2020 0.0     Sodium 10/16/2020 143     Potassium 10/16/2020 4.1     Chloride 10/16/2020 108     CO2 10/16/2020 23     Anion Gap 10/16/2020 12     Glucose 10/16/2020 94     BUN 10/16/2020 18     CREATININE 10/16/2020 1.0     GFR Non- American 10/16/2020 57*    GFR  10/16/2020 >60     Calcium 10/16/2020 9.0     Total Protein 10/16/2020 6.7     Alb 10/16/2020 4.3     Albumin/Globulin Ratio 10/16/2020 1.8     Total Bilirubin 10/16/2020 0.4     Alkaline Phosphatase 10/16/2020 70     ALT 10/16/2020 11     AST 10/16/2020 18     Globulin 10/16/2020 2.4          Assessment/Plan     1. Primary insomnia  -stable  -Continue same medications    2. Anxiety  -stable  -Continue same medications      Discussed medications with patient, who voiced understanding of their use and indications. All questions answered. Return in about 3 months (around 4/14/2021) for insomnia, anxiety, hypertension, hyperlipidemia, and prediabetes.

## 2021-02-22 DIAGNOSIS — F41.9 ANXIETY: ICD-10-CM

## 2021-02-22 DIAGNOSIS — E55.9 VITAMIN D DEFICIENCY: ICD-10-CM

## 2021-02-22 DIAGNOSIS — F51.01 PRIMARY INSOMNIA: ICD-10-CM

## 2021-02-23 RX ORDER — CLONAZEPAM 1 MG/1
TABLET ORAL
Qty: 45 TABLET | Refills: 1 | Status: SHIPPED | OUTPATIENT
Start: 2021-02-23 | End: 2021-09-13 | Stop reason: SDUPTHER

## 2021-02-23 RX ORDER — ERGOCALCIFEROL 1.25 MG/1
CAPSULE ORAL
Qty: 4 CAPSULE | Refills: 5 | Status: SHIPPED | OUTPATIENT
Start: 2021-02-23 | End: 2021-08-09

## 2021-02-23 NOTE — TELEPHONE ENCOUNTER
Medication:   Requested Prescriptions     Pending Prescriptions Disp Refills    vitamin D (ERGOCALCIFEROL) 1.25 MG (47634 UT) CAPS capsule [Pharmacy Med Name: VIT D2 (ERGOCAL) 1.25MG(50,000U) CP] 4 capsule 2     Sig: TAKE ONE CAPSULE BY MOUTH ONCE WEEKLY    clonazePAM (KLONOPIN) 1 MG tablet [Pharmacy Med Name: clonazePAM 1 MG TABLET] 45 tablet 0     Sig: TAKE ONE TABLET BY MOUTH EVERY NIGHT FOR INSOMNIA AND TAKE ONE TABLET BY MOUTH DAILY AS NEEDED FOR ANXIETY     Last Filled:  11.9.2020  1.4.2021    Last appt: 1/14/2021   Next appt: 4/15/2021    Last Labs DM:   Lab Results   Component Value Date    LABA1C 6.1 10/16/2020     Last Lipid:   Lab Results   Component Value Date    CHOL 202 10/16/2020    TRIG 90 10/16/2020    HDL 80 10/16/2020    [address] 104 10/16/2020     Last PSA: No results found for: PSA  Last Thyroid:   Lab Results   Component Value Date    TSH 1.20 10/16/2020

## 2021-03-20 DIAGNOSIS — E78.2 MIXED HYPERLIPIDEMIA: ICD-10-CM

## 2021-03-22 RX ORDER — ATORVASTATIN CALCIUM 20 MG/1
TABLET, FILM COATED ORAL
Qty: 30 TABLET | Refills: 4 | Status: SHIPPED | OUTPATIENT
Start: 2021-03-22 | End: 2021-12-20

## 2021-03-22 NOTE — TELEPHONE ENCOUNTER
Medication:   Requested Prescriptions     Pending Prescriptions Disp Refills    atorvastatin (LIPITOR) 20 MG tablet [Pharmacy Med Name: ATORVASTATIN 20 MG TABLET] 30 tablet 4     Sig: TAKE ONE TABLET BY MOUTH ONCE NIGHTLY     Last Filled: 10.16.20    Last appt: 10.16.20   Next appt: 4/15/2021    Last OARRS:   RX Monitoring 1/4/2021   Attestation -   Periodic Controlled Substance Monitoring No signs of potential drug abuse or diversion identified.

## 2021-04-15 ENCOUNTER — OFFICE VISIT (OUTPATIENT)
Dept: PRIMARY CARE CLINIC | Age: 56
End: 2021-04-15
Payer: COMMERCIAL

## 2021-04-15 VITALS
OXYGEN SATURATION: 97 % | HEART RATE: 97 BPM | TEMPERATURE: 99 F | SYSTOLIC BLOOD PRESSURE: 120 MMHG | HEIGHT: 62 IN | DIASTOLIC BLOOD PRESSURE: 78 MMHG | WEIGHT: 136.8 LBS | BODY MASS INDEX: 25.17 KG/M2

## 2021-04-15 DIAGNOSIS — F51.01 PRIMARY INSOMNIA: ICD-10-CM

## 2021-04-15 DIAGNOSIS — I10 ESSENTIAL HYPERTENSION: ICD-10-CM

## 2021-04-15 DIAGNOSIS — R73.03 PREDIABETES: ICD-10-CM

## 2021-04-15 DIAGNOSIS — F41.9 ANXIETY: ICD-10-CM

## 2021-04-15 DIAGNOSIS — E78.2 MIXED HYPERLIPIDEMIA: ICD-10-CM

## 2021-04-15 DIAGNOSIS — Z01.83 BLOOD TYPING ENCOUNTER: ICD-10-CM

## 2021-04-15 DIAGNOSIS — E78.2 MIXED HYPERLIPIDEMIA: Primary | ICD-10-CM

## 2021-04-15 DIAGNOSIS — E55.9 VITAMIN D DEFICIENCY: ICD-10-CM

## 2021-04-15 LAB
A/G RATIO: 1.8 (ref 1.1–2.2)
ABO/RH: NORMAL
ALBUMIN SERPL-MCNC: 4.6 G/DL (ref 3.4–5)
ALP BLD-CCNC: 77 U/L (ref 40–129)
ALT SERPL-CCNC: 16 U/L (ref 10–40)
ANION GAP SERPL CALCULATED.3IONS-SCNC: 12 MMOL/L (ref 3–16)
ANTIBODY SCREEN: NORMAL
AST SERPL-CCNC: 23 U/L (ref 15–37)
BILIRUB SERPL-MCNC: 0.5 MG/DL (ref 0–1)
BUN BLDV-MCNC: 16 MG/DL (ref 7–20)
CALCIUM SERPL-MCNC: 9.5 MG/DL (ref 8.3–10.6)
CHLORIDE BLD-SCNC: 107 MMOL/L (ref 99–110)
CHOLESTEROL, TOTAL: 189 MG/DL (ref 0–199)
CO2: 23 MMOL/L (ref 21–32)
CREAT SERPL-MCNC: 0.9 MG/DL (ref 0.6–1.1)
GFR AFRICAN AMERICAN: >60
GFR NON-AFRICAN AMERICAN: >60
GLOBULIN: 2.5 G/DL
GLUCOSE BLD-MCNC: 105 MG/DL (ref 70–99)
HBA1C MFR BLD: 5.4 %
HDLC SERPL-MCNC: 71 MG/DL (ref 40–60)
LDL CHOLESTEROL CALCULATED: 97 MG/DL
POTASSIUM SERPL-SCNC: 3.8 MMOL/L (ref 3.5–5.1)
SODIUM BLD-SCNC: 142 MMOL/L (ref 136–145)
TOTAL PROTEIN: 7.1 G/DL (ref 6.4–8.2)
TRIGL SERPL-MCNC: 105 MG/DL (ref 0–150)
VITAMIN D 25-HYDROXY: 56 NG/ML
VLDLC SERPL CALC-MCNC: 21 MG/DL

## 2021-04-15 PROCEDURE — 83036 HEMOGLOBIN GLYCOSYLATED A1C: CPT | Performed by: INTERNAL MEDICINE

## 2021-04-15 PROCEDURE — G8427 DOCREV CUR MEDS BY ELIG CLIN: HCPCS | Performed by: INTERNAL MEDICINE

## 2021-04-15 PROCEDURE — G8419 CALC BMI OUT NRM PARAM NOF/U: HCPCS | Performed by: INTERNAL MEDICINE

## 2021-04-15 PROCEDURE — 1036F TOBACCO NON-USER: CPT | Performed by: INTERNAL MEDICINE

## 2021-04-15 PROCEDURE — 99214 OFFICE O/P EST MOD 30 MIN: CPT | Performed by: INTERNAL MEDICINE

## 2021-04-15 PROCEDURE — 3017F COLORECTAL CA SCREEN DOC REV: CPT | Performed by: INTERNAL MEDICINE

## 2021-04-15 ASSESSMENT — ENCOUNTER SYMPTOMS
CONSTIPATION: 0
RHINORRHEA: 0
DIARRHEA: 0
BLOOD IN STOOL: 0
VOMITING: 0
CHEST TIGHTNESS: 0
SORE THROAT: 0
WHEEZING: 0
SHORTNESS OF BREATH: 0
SINUS PRESSURE: 0
COUGH: 0
ABDOMINAL PAIN: 0
NAUSEA: 0

## 2021-04-15 NOTE — PATIENT INSTRUCTIONS
1. Mixed hyperlipidemia  -Continue same medications  -Low fat, low cholesterol diet  -Regular aerobic exercise  -Lipid Panel; Future  -Comprehensive Metabolic Panel; Future    2. Primary insomnia  -stable  -Continue same medications    3. Anxiety  -stable  -Continue same medications    4. Essential hypertension  -stable off medication  -Low sodium diet  -Regular aerobic exercise    5. Prediabetes  -Hemoglobin A1c of 5.4% shows improvement and is normal  -Low carbohydrate diet  -Regular aerobic exercise    6. Vitamin D deficiency  -stable  -Continue same medications  -Vitamin D 25 Hydroxy;  Future

## 2021-04-15 NOTE — PROGRESS NOTES
Justen Alvarenga   Date ofBirth:  1965    Date of Visit:  4/15/2021    Chief Complaint   Patient presents with    Hyperlipidemia    Insomnia    Other     pre-diabetes and vitamin D deficiency    Anxiety    Hypertension       HPI  Hyperlipidemia- Patient takes Atorvastatin 20mg po qhs. Patient does a low fat, low cholesterol diet. Patient does elliptical for 30 minutes 3 times per week, walks 30 minutes 2 times per week, and lifts weights 4 days per week. Insomnia- Patient takes Klonopin 1mg 1 tablet po qhs. Patient states Klonopin helps her sleep through the night. Patient denies side effects.      Anxiety- Patient takes Klonopin 1mg once daily as needed. Patient states her anxiety is better and has improved. Patient denies panic attacks. Patient states she takes liquid tincture CBD/THC 1/4 dropper once a week. Hypertension- Patient is off BP medication. Patient monitors her BP and it has been 118-123/782. Patient does a low salt diet. Patient does elliptical for 30 minutes 3 times per week, walks 30 minutes 2 times per week, and lifts weights 4 days per week.     Vitamin D deficiency- Patient takes Vitamin D 50,000 IU po q week. Prediabetes-Patient decreases carbohydrates and states she has decreased wine. Review of Systems   Constitutional: Negative for activity change, appetite change, chills, fatigue, fever and unexpected weight change. HENT: Negative for congestion, postnasal drip, rhinorrhea, sinus pressure and sore throat. Eyes: Negative for visual disturbance. Respiratory: Negative for cough, chest tightness, shortness of breath and wheezing. Cardiovascular: Negative for chest pain, palpitations and leg swelling. Gastrointestinal: Negative for abdominal pain, blood in stool, constipation, diarrhea, nausea and vomiting. Genitourinary: Negative for dysuria, frequency and hematuria. Musculoskeletal: Negative for arthralgias and myalgias.    Skin: Negative for There is no distension. Palpations: Abdomen is soft. Tenderness: There is no abdominal tenderness. Musculoskeletal:      Right lower leg: No edema. Left lower leg: No edema. Lymphadenopathy:      Head:      Right side of head: No submandibular adenopathy. Left side of head: No submandibular adenopathy. Neurological:      Mental Status: She is alert and oriented to person, place, and time.    Psychiatric:         Mood and Affect: Mood normal.           Results for POC orders placed in visit on 04/15/21   POCT glycosylated hemoglobin (Hb A1C)   Result Value Ref Range    Hemoglobin A1C 5.4 %     Lab Review   Orders Only on 10/16/2020   Component Date Value    Vit D, 25-Hydroxy 10/16/2020 56.6     TSH 10/16/2020 1.20     Hemoglobin A1C 10/16/2020 6.1     eAG 10/16/2020 128.4     Cholesterol, Total 10/16/2020 202*    Triglycerides 10/16/2020 90     HDL 10/16/2020 80*    LDL Calculated 10/16/2020 104*    VLDL Cholesterol Calcula* 10/16/2020 18     WBC 10/16/2020 5.1     RBC 10/16/2020 3.70*    Hemoglobin 10/16/2020 11.2*    Hematocrit 10/16/2020 34.4*    MCV 10/16/2020 93.1     MCH 10/16/2020 30.3     MCHC 10/16/2020 32.6     RDW 10/16/2020 12.5     Platelets 89/26/5627 227     MPV 10/16/2020 9.2     Neutrophils % 10/16/2020 66.8     Lymphocytes % 10/16/2020 26.1     Monocytes % 10/16/2020 6.0     Eosinophils % 10/16/2020 0.6     Basophils % 10/16/2020 0.5     Neutrophils Absolute 10/16/2020 3.4     Lymphocytes Absolute 10/16/2020 1.3     Monocytes Absolute 10/16/2020 0.3     Eosinophils Absolute 10/16/2020 0.0     Basophils Absolute 10/16/2020 0.0     Sodium 10/16/2020 143     Potassium 10/16/2020 4.1     Chloride 10/16/2020 108     CO2 10/16/2020 23     Anion Gap 10/16/2020 12     Glucose 10/16/2020 94     BUN 10/16/2020 18     CREATININE 10/16/2020 1.0     GFR Non- 10/16/2020 57*    GFR  10/16/2020 >60     Calcium 10/16/2020 9.0     Total Protein 10/16/2020 6.7     Albumin 10/16/2020 4.3     Albumin/Globulin Ratio 10/16/2020 1.8     Total Bilirubin 10/16/2020 0.4     Alkaline Phosphatase 10/16/2020 70     ALT 10/16/2020 11     AST 10/16/2020 18     Globulin 10/16/2020 2.4          Assessment/Plan     1. Mixed hyperlipidemia  -Continue same medications  -Low fat, low cholesterol diet  -Regular aerobic exercise  -Lipid Panel; Future  -Comprehensive Metabolic Panel; Future    2. Primary insomnia  -stable  -Continue same medications    3. Anxiety  -stable  -Continue same medications    4. Essential hypertension  -stable off medication  -Low sodium diet  -Regular aerobic exercise    5. Prediabetes  -Hemoglobin A1c of 5.4% shows improvement and is normal  -Low carbohydrate diet  -Regular aerobic exercise  - POCT glycosylated hemoglobin (Hb A1C)    6. Vitamin D deficiency  -stable  -Continue same medications  -Vitamin D 25 Hydroxy; Future      Discussed medications with patient, who voiced understanding of their use and indications. All questions answered. Controlled Substance Monitoring:    Acute and Chronic Pain Monitoring:   RX Monitoring 4/15/2021   Attestation -   Periodic Controlled Substance Monitoring No signs of potential drug abuse or diversion identified. Return in about 3 months (around 7/15/2021) for anxiety and insomnia.

## 2021-06-21 ENCOUNTER — OFFICE VISIT (OUTPATIENT)
Dept: PRIMARY CARE CLINIC | Age: 56
End: 2021-06-21
Payer: COMMERCIAL

## 2021-06-21 VITALS
DIASTOLIC BLOOD PRESSURE: 82 MMHG | TEMPERATURE: 98.4 F | SYSTOLIC BLOOD PRESSURE: 136 MMHG | HEART RATE: 72 BPM | HEIGHT: 62 IN | OXYGEN SATURATION: 99 % | WEIGHT: 135.6 LBS | BODY MASS INDEX: 24.95 KG/M2

## 2021-06-21 DIAGNOSIS — R55 SYNCOPE AND COLLAPSE: ICD-10-CM

## 2021-06-21 DIAGNOSIS — R51.9 TEMPORAL PAIN: Primary | ICD-10-CM

## 2021-06-21 PROCEDURE — G8427 DOCREV CUR MEDS BY ELIG CLIN: HCPCS | Performed by: NURSE PRACTITIONER

## 2021-06-21 PROCEDURE — 99214 OFFICE O/P EST MOD 30 MIN: CPT | Performed by: NURSE PRACTITIONER

## 2021-06-21 PROCEDURE — 1036F TOBACCO NON-USER: CPT | Performed by: NURSE PRACTITIONER

## 2021-06-21 PROCEDURE — G8419 CALC BMI OUT NRM PARAM NOF/U: HCPCS | Performed by: NURSE PRACTITIONER

## 2021-06-21 PROCEDURE — 3017F COLORECTAL CA SCREEN DOC REV: CPT | Performed by: NURSE PRACTITIONER

## 2021-06-21 PROCEDURE — 93000 ELECTROCARDIOGRAM COMPLETE: CPT | Performed by: NURSE PRACTITIONER

## 2021-06-21 ASSESSMENT — ENCOUNTER SYMPTOMS
SORE THROAT: 0
SHORTNESS OF BREATH: 0
EYE PAIN: 0
SINUS PRESSURE: 0
NAUSEA: 0
SINUS PAIN: 0
COUGH: 0
DIARRHEA: 0
VOMITING: 0
ABDOMINAL PAIN: 0
CHEST TIGHTNESS: 0
PHOTOPHOBIA: 0
FACIAL SWELLING: 0
COLOR CHANGE: 0
RHINORRHEA: 0
WHEEZING: 0

## 2021-06-21 ASSESSMENT — PATIENT HEALTH QUESTIONNAIRE - PHQ9
SUM OF ALL RESPONSES TO PHQ QUESTIONS 1-9: 0
SUM OF ALL RESPONSES TO PHQ QUESTIONS 1-9: 0
2. FEELING DOWN, DEPRESSED OR HOPELESS: 0
SUM OF ALL RESPONSES TO PHQ QUESTIONS 1-9: 0
1. LITTLE INTEREST OR PLEASURE IN DOING THINGS: 0
SUM OF ALL RESPONSES TO PHQ9 QUESTIONS 1 & 2: 0

## 2021-06-21 NOTE — PROGRESS NOTES
ENCOUNTER DATE: 6/21/2021     NAME: Serge Eden   AGE: 63 y.o. GENDER: female   YOB: 1965    Patient Active Problem List   Diagnosis    Muscle spasms of neck    Laryngopharyngeal reflux    Laryngeal spasm    Shortness of breath    Hyperlipidemia    Glaucoma    Polyp of colon    Atypical chest pain    Chest pain    Insomnia    Genital herpes simplex    Right-sided low back pain without sciatica    EKG abnormality    Tenosynovitis of ankle    Essential hypertension    Vitamin D deficiency    Anemia    Temporal pain    Anxiety    Prediabetes    Syncope and collapse      No Known Allergies  Current Outpatient Medications on File Prior to Visit   Medication Sig Dispense Refill    atorvastatin (LIPITOR) 20 MG tablet TAKE ONE TABLET BY MOUTH ONCE NIGHTLY 30 tablet 4    vitamin D (ERGOCALCIFEROL) 1.25 MG (82795 UT) CAPS capsule TAKE ONE CAPSULE BY MOUTH ONCE WEEKLY 4 capsule 5    clonazePAM (KLONOPIN) 1 MG tablet TAKE ONE TABLET BY MOUTH EVERY NIGHT FOR INSOMNIA AND TAKE ONE TABLET BY MOUTH DAILY AS NEEDED FOR ANXIETY 45 tablet 1    latanoprost (XALATAN) 0.005 % ophthalmic solution Place 1 drop into both eyes nightly   0     No current facility-administered medications on file prior to visit. Social History     Tobacco Use    Smoking status: Never Smoker    Smokeless tobacco: Never Used   Substance Use Topics    Alcohol use:  Yes     Alcohol/week: 6.0 standard drinks     Types: 6 Glasses of wine per week      CARE TEAM  Patient Care Team:  Shanae Rosales MD as PCP - General (Internal Medicine)  Shanae Rosales MD as PCP - REHABILITATION [facility] Empaneled Provider  Ricardo Howe MD as Consulting Physician (Gastroenterology)  Abelino Vaughn MD as Consulting Physician (Obstetrics & Gynecology)  Nancy Truong MD as Consulting Physician (Cardiology)    Chief Complaint   Patient presents with    Headache     right  temple  bothering  her  x 2 weeks      HPI:   Patient is here for a problem visit. TEMPORAL PAIN:  Complains of headache in right temple area x 2 wks. Had similar signs and symptoms in 2020 except on the left side. Had MRI which was negative. Signs and symptoms later resolved on their own. Now has right sided temporal tenderness. Will feel like \"lightening kimberley at times if she rubs a certain way. Pain is constant and light\". When she touches it gets sharp, burning feeling. No headache. No vision changes. No floaters. No dizziness. Has been taking IBP  Someone mentioned it could be shingles. Has taken valtrax and this seemed to help it for 3 days. No rash/lesions. No rash/lesions developed last yr after similar episode. BP well controlled. This wknd bp was 110/70 and 70/50 on Sunday am. She did feel a little dizzy at the time. SYNCOPE:  Had incident at the home of a friend on Saturday night. She had 2 martinis and was sitting down at the table having a conversation. She got up and \"Walked over to the sink and went limp\". Was reportedly unresponsive for less than 2 min. Her  started chest compressions. Her friend who is a nurse checked her pulse and was told it was weak. The RN gave her 2 rescue breaths and \"then she woke up and was fine\". When she woke up she states she had a dream. EMS was called. She was coherent. EMS came, she declined any assessment. She declined EMS transport. No vitals were checked. Doesn't recall any chest pains or palpitations or dizziness prior to passing out. Having increased laryngeal spasms about once per month now. Will relax her way through it. Has seen specialist in the past for this. Works out 5 days/wk. No signs and symptoms with exercise.      She has blacked out in the past about 4 years ago with similar signs and symptoms   She did wear a holter monitor and had a stress test where were negative    ROS:  Review of Systems   Constitutional: Negative for activity change, appetite change, bruit. Cardiovascular:      Rate and Rhythm: Normal rate and regular rhythm. Heart sounds: Normal heart sounds. No murmur heard. No friction rub. Pulmonary:      Effort: Pulmonary effort is normal. No respiratory distress. Breath sounds: Normal breath sounds. No wheezing, rhonchi or rales. Musculoskeletal:      Right lower leg: No edema. Left lower leg: No edema. Lymphadenopathy:      Cervical: No cervical adenopathy. Skin:     General: Skin is warm and dry. Coloration: Skin is not pale. Findings: No erythema or rash. Neurological:      Mental Status: She is alert and oriented to person, place, and time. Motor: No weakness. Gait: Gait normal.   Psychiatric:         Mood and Affect: Mood normal.        EXAM: CT HEAD WO CONTRAST. DATE: 7/21/2020 8:50 AM.       INDICATION: Frequent headaches. COMPARISON: None   TECHNIQUE: Axial CT imaging obtained of the head. Axial images and multiplanar reformatted images are provided for review. Up-to-date CT equipment and radiation dose reduction techniques were employed. CONTRAST: None.       FINDINGS:       No acute intracranial hemorrhage, mass effect, or extra axial collection. Normal ventricular and sulcal size. The gray-white differentiation is intact.       No acute calvarial abnormalities. The orbits are normal. The paranasal sinuses and mastoid air cells are clear.           Impression       No acute intracranial hemorrhage or mass effect.         EKG: normal EKG, normal sinus rhythm. ASSESSMENT/PLAN:  1. Temporal pain  Discussed case with Dr Eda Sullivan. Will return to clinic for labs. Orders entered. Referral to neuro for further evaluation. Patient will call for apt  CT from 2020 reviewed. Check MRI of brain.   - CBC; Future  - Comprehensive Metabolic Panel; Future  - SEDIMENTATION RATE; Future  - C-REACTIVE PROTEIN; Future  - MRI BRAIN W WO CONTRAST; Future  - VL DUP CAROTID BILATERAL;  Future  - [facility] Sebastian Carbajal MD, Neurology, [facility]    2. Syncope and collapse  EKG completed. No acute concerns. Will rtc for labs. Orders entered. Follow up with cardio. New referral placed per patient request.   Check carotid study. Check MRI of brain. Patient will schedule. - MRI BRAIN W WO CONTRAST; Future  - EKG 12 Lead  - VL DUP CAROTID BILATERAL; Future  - Melvin Arce MD, Neurology, Shelly Vasquez MD, Cardiology, [facility]      Return in about 3 weeks (around 7/12/2021) for follow up testing with Dr Roz Trivedi.      Electronically signed by MARI Gastelum CNP on 6/21/2021 at 6:59 PM

## 2021-06-21 NOTE — PATIENT INSTRUCTIONS
Return to clinic for labs. Orders entered. Do not have to be fasting. Call to schedule MRI of brain and carotid study ([phone]).      Call to schedule apt with neurologist.     Call to schedule apt with cardiologist.

## 2021-06-22 DIAGNOSIS — R51.9 TEMPORAL PAIN: ICD-10-CM

## 2021-06-22 LAB
A/G RATIO: 1.8 (ref 1.1–2.2)
ALBUMIN SERPL-MCNC: 4.3 G/DL (ref 3.4–5)
ALP BLD-CCNC: 71 U/L (ref 40–129)
ALT SERPL-CCNC: 13 U/L (ref 10–40)
ANION GAP SERPL CALCULATED.3IONS-SCNC: 9 MMOL/L (ref 3–16)
AST SERPL-CCNC: 19 U/L (ref 15–37)
BILIRUB SERPL-MCNC: 0.6 MG/DL (ref 0–1)
BUN BLDV-MCNC: 12 MG/DL (ref 7–20)
C-REACTIVE PROTEIN: <3 MG/L (ref 0–5.1)
CALCIUM SERPL-MCNC: 9.3 MG/DL (ref 8.3–10.6)
CHLORIDE BLD-SCNC: 109 MMOL/L (ref 99–110)
CO2: 26 MMOL/L (ref 21–32)
CREAT SERPL-MCNC: 0.9 MG/DL (ref 0.6–1.1)
GFR AFRICAN AMERICAN: >60
GFR NON-AFRICAN AMERICAN: >60
GLOBULIN: 2.4 G/DL
GLUCOSE BLD-MCNC: 98 MG/DL (ref 70–99)
HCT VFR BLD CALC: 33.7 % (ref 36–48)
HEMOGLOBIN: 11.3 G/DL (ref 12–16)
MCH RBC QN AUTO: 31.1 PG (ref 26–34)
MCHC RBC AUTO-ENTMCNC: 33.5 G/DL (ref 31–36)
MCV RBC AUTO: 93 FL (ref 80–100)
PDW BLD-RTO: 13.7 % (ref 12.4–15.4)
PLATELET # BLD: 219 K/UL (ref 135–450)
PMV BLD AUTO: 9.6 FL (ref 5–10.5)
POTASSIUM SERPL-SCNC: 4.5 MMOL/L (ref 3.5–5.1)
RBC # BLD: 3.63 M/UL (ref 4–5.2)
SEDIMENTATION RATE, ERYTHROCYTE: 12 MM/HR (ref 0–30)
SODIUM BLD-SCNC: 144 MMOL/L (ref 136–145)
TOTAL PROTEIN: 6.7 G/DL (ref 6.4–8.2)
WBC # BLD: 4.6 K/UL (ref 4–11)

## 2021-06-23 ENCOUNTER — TELEPHONE (OUTPATIENT)
Dept: PRIMARY CARE CLINIC | Age: 56
End: 2021-06-23

## 2021-06-23 NOTE — TELEPHONE ENCOUNTER
Pt states that she has an appt for the Cardiologist in late July. She is going to see the Neurologist in late August.    The other 2 test are going to be put on hold due to the cost.    Pt was also given her lab results.

## 2021-08-02 ENCOUNTER — OFFICE VISIT (OUTPATIENT)
Dept: NEUROLOGY | Age: 56
End: 2021-08-02
Payer: COMMERCIAL

## 2021-08-02 VITALS
WEIGHT: 135.1 LBS | DIASTOLIC BLOOD PRESSURE: 80 MMHG | HEART RATE: 72 BPM | SYSTOLIC BLOOD PRESSURE: 138 MMHG | BODY MASS INDEX: 25.11 KG/M2

## 2021-08-02 DIAGNOSIS — G44.049 CHRONIC PAROXYSMAL HEMICRANIA, NOT INTRACTABLE: ICD-10-CM

## 2021-08-02 DIAGNOSIS — R55 SYNCOPE AND COLLAPSE: Primary | ICD-10-CM

## 2021-08-02 PROCEDURE — G8427 DOCREV CUR MEDS BY ELIG CLIN: HCPCS | Performed by: PSYCHIATRY & NEUROLOGY

## 2021-08-02 PROCEDURE — G8419 CALC BMI OUT NRM PARAM NOF/U: HCPCS | Performed by: PSYCHIATRY & NEUROLOGY

## 2021-08-02 PROCEDURE — 99204 OFFICE O/P NEW MOD 45 MIN: CPT | Performed by: PSYCHIATRY & NEUROLOGY

## 2021-08-02 NOTE — PROGRESS NOTES
NEUROLOGY CONSULTATION     Chief Complaint   Patient presents with    New Patient    Loss of Consciousness     no ER visit. . jose happened in the past ( 5 years ago)        HISTORY OF PRESENT ILLNESS :    Marcellus De La Cruz is a 63 y.o. female who is referred by Dr. Lynn Cleaves   History was obtained from patient  Patient was referred for a few neurological symptoms. Patient stated that she has had episodes of temporal pain. A few years ago she had left temporal pain which was sharp and jabbing. It lasted for a few weeks and then went away on its own. Recently patient developed similar symptoms on the right temporal region. This again lasted for a few weeks but now has totally resolved. Patient states that about 6 years ago she had an episode in which she was in a wine tasting event and had some alcohol. Then she came home and she had passed out. No one noticed any seizure activity. However the furniture had moved and there was dent in the drywall. About a month ago patient was at a neighbor's house and had a martini drink. She then was at the kitchen sink and slumped over. Her  caught her. She was briefly unresponsive. There was no bladder or bowel incontinence. Again there was no seizure activity. She regained consciousness fairly quickly. There was no postictal confusion. She was able to restart the conversation and then went back home and felt fine ever since then.   Patient had a CT head in July 2020 which was normal.    REVIEW OF SYSTEMS    Constitutional:  []   Chills   []  Fatigue   []  Fevers   []  Malaise   []  Weight loss     [x] Denies all of the above    Eyes:  []  Double vision   []  Blurry vision     [x] Denies all of the above    Ears, nose, mouth, throat, and face:   [] Hearing loss    []   Hoarseness      []  Snoring    []  Tinnitus       [x] Denies all of the above     Respiratory:   []  Cough    [] Shortness of breath         [x] Denies all of the above     Cardiovascular:   []  Chest pain    []  Exertional chest pressure/discomfort           [] Palpitations    []  Syncope     [x] Denies all of the above    Gastrointestinal:   []  Abdominal pain   []  Constipation    []  Diarrhea    []   Dysphagia                      [x] Denies all of the above    Genitourinary:      []  Frequency   []  Hematuria     []  Urinary incontinence           [x] Denies all of the above     Hematologic/lymphatic:  []  Bleeding    []  Easy bruising   []  Anemia  [x] Denies all of the above     Musculoskeletal:   [] Back pain       []  Myalgias    []  Neck pain           [x] Denies all of the above    Neurological: As noted in HPI    Behavioral/Psych:   [] Anxiety    []  Depression     []  Mood swings     [x] Denies all of the above     Endocrine:   []  Temperature intolerance     [] Fatigue      [x] Denies all of the above     Allergic/Immunologic:   [] Hay fever    [x] Denies all of the above     Past Medical History:   Diagnosis Date    Acid reflux     Allergic rhinitis     Anxiety     Essential hypertension 7/20/2018    Glaucoma     bilateral    Hyperlipidemia     borderline     Insomnia      Family History   Problem Relation Age of Onset    Diabetes Mother     High Blood Pressure Mother     Cancer Mother         endometrial    Heart Disease Maternal Grandmother     High Blood Pressure Maternal Grandmother     Stroke Maternal Grandmother     Cancer Maternal Grandfather         throat     Social History     Socioeconomic History    Marital status:      Spouse name: Not on file    Number of children: Not on file    Years of education: Not on file    Highest education level: Not on file   Occupational History    Not on file   Tobacco Use    Smoking status: Never Smoker    Smokeless tobacco: Never Used   Vaping Use    Vaping Use: Never used   Substance and Sexual Activity    Alcohol use:  Yes Alcohol/week: 6.0 standard drinks     Types: 6 Glasses of wine per week    Drug use: No    Sexual activity: Yes     Partners: Male   Other Topics Concern    Not on file   Social History Narrative    Not on file     Social Determinants of Health     Financial Resource Strain:     Difficulty of Paying Living Expenses:    Food Insecurity:     Worried About Running Out of Food in the Last Year:     [address] in the Last Year:    Transportation Needs:     Lack of Transportation (Medical):  Lack of Transportation (Non-Medical):    Physical Activity:     Days of Exercise per Week:     Minutes of Exercise per Session:    Stress:     Feeling of Stress :    Social Connections:     Frequency of Communication with Friends and Family:     Frequency of Social Gatherings with Friends and Family:     Attends [employer] Services:     Active Member of Clubs or Organizations:     Attends Club or Organization Meetings:     Marital Status:    Intimate Partner Violence:     Fear of Current or Ex-Partner:     Emotionally Abused:     Physically Abused:     Sexually Abused:        PHYSICAL EXAMINATION:  /80   Pulse 72   Wt 135 lb 1.6 oz (61.3 kg)   BMI 25.11 kg/m²   Appearance: Well appearing, well nourished and in no distress  Mental Status Exam: Patient is alert, oriented to person, place and time. Recent and remote memory is normal  Fund of Knowledge is normal  Attention/concentration is normal.   Speech : No dysarthria  Language : No aphasia  Funduscopic Exam: sharp disc margins  Cranial Nerves:   II: Visual fields:  Full to confrontation  III: Pupils:  equal, round, reactive to light  III,IV,VI: Extra Ocular Movements are intact.  No nystagmus  V: Facial sensation is intact to pin prick and light touch  VII: Facial strength and movements: intact and symmetric smile,cheek puffing and eyebrow elevation  VIII: Hearing:  Intact to finger rub bilaterally  IX: Palate  elevation is symmetric  XI:

## 2021-08-09 DIAGNOSIS — E55.9 VITAMIN D DEFICIENCY: ICD-10-CM

## 2021-08-09 RX ORDER — ERGOCALCIFEROL 1.25 MG/1
CAPSULE ORAL
Qty: 4 CAPSULE | Refills: 4 | Status: SHIPPED | OUTPATIENT
Start: 2021-08-09 | End: 2022-01-03

## 2021-08-09 NOTE — TELEPHONE ENCOUNTER
Medication:   Requested Prescriptions     Pending Prescriptions Disp Refills    vitamin D (ERGOCALCIFEROL) 1.25 MG (83402 UT) CAPS capsule [Pharmacy Med Name: VIT D2 (ERGOCAL) 1.25MG(50,000U) CP] 4 capsule 4     Sig: TAKE ONE CAPSULE BY MOUTH ONCE WEEKLY     Last Filled:  02/23/21    Last appt: 6/21/2021   Next appt: Visit date not found    Last OARRS:   RX Monitoring 4/15/2021   Attestation -   Periodic Controlled Substance Monitoring No signs of potential drug abuse or diversion identified.

## 2021-09-10 DIAGNOSIS — F51.01 PRIMARY INSOMNIA: ICD-10-CM

## 2021-09-10 DIAGNOSIS — F41.9 ANXIETY: ICD-10-CM

## 2021-09-10 RX ORDER — CLONAZEPAM 1 MG/1
TABLET ORAL
Qty: 45 TABLET | OUTPATIENT
Start: 2021-09-10

## 2021-09-10 NOTE — TELEPHONE ENCOUNTER
Call patient. She needs appointment for Klonopin refill. She was last seen for anxiety and insomnia greater than 90 days ago on 4/15/21.

## 2021-09-10 NOTE — TELEPHONE ENCOUNTER
Medication:   Requested Prescriptions     Pending Prescriptions Disp Refills    clonazePAM (KLONOPIN) 1 MG tablet [Pharmacy Med Name: clonazePAM 1 MG TABLET] 45 tablet      Sig: TAKE ONE TABLET BY MOUTH ONCE NIGHTLY FOR INSOMNIA AND TAKE ONE TABLET BY MOUTH DAILY AS NEEDED FOR ANXIETY     Last Filled: 2.23.21    Last appt: 6/21/2021   Next appt: Visit date not found    Last OARRS:   RX Monitoring 4/15/2021   Attestation -   Periodic Controlled Substance Monitoring No signs of potential drug abuse or diversion identified.

## 2021-09-13 ENCOUNTER — OFFICE VISIT (OUTPATIENT)
Dept: PRIMARY CARE CLINIC | Age: 56
End: 2021-09-13
Payer: COMMERCIAL

## 2021-09-13 VITALS
TEMPERATURE: 97.9 F | SYSTOLIC BLOOD PRESSURE: 126 MMHG | WEIGHT: 137.4 LBS | HEIGHT: 62 IN | BODY MASS INDEX: 25.28 KG/M2 | OXYGEN SATURATION: 98 % | HEART RATE: 68 BPM | DIASTOLIC BLOOD PRESSURE: 84 MMHG

## 2021-09-13 DIAGNOSIS — Z00.00 ANNUAL PHYSICAL EXAM: Primary | ICD-10-CM

## 2021-09-13 DIAGNOSIS — F51.01 PRIMARY INSOMNIA: Primary | ICD-10-CM

## 2021-09-13 DIAGNOSIS — F41.9 ANXIETY: ICD-10-CM

## 2021-09-13 DIAGNOSIS — R73.03 PREDIABETES: ICD-10-CM

## 2021-09-13 DIAGNOSIS — E78.2 MIXED HYPERLIPIDEMIA: ICD-10-CM

## 2021-09-13 PROCEDURE — G8419 CALC BMI OUT NRM PARAM NOF/U: HCPCS | Performed by: INTERNAL MEDICINE

## 2021-09-13 PROCEDURE — G8427 DOCREV CUR MEDS BY ELIG CLIN: HCPCS | Performed by: INTERNAL MEDICINE

## 2021-09-13 PROCEDURE — 1036F TOBACCO NON-USER: CPT | Performed by: INTERNAL MEDICINE

## 2021-09-13 PROCEDURE — 99213 OFFICE O/P EST LOW 20 MIN: CPT | Performed by: INTERNAL MEDICINE

## 2021-09-13 PROCEDURE — 3017F COLORECTAL CA SCREEN DOC REV: CPT | Performed by: INTERNAL MEDICINE

## 2021-09-13 RX ORDER — CLONAZEPAM 1 MG/1
TABLET ORAL
Qty: 45 TABLET | Refills: 2 | Status: SHIPPED | OUTPATIENT
Start: 2021-09-13 | End: 2022-01-17 | Stop reason: SDUPTHER

## 2021-09-13 SDOH — ECONOMIC STABILITY: FOOD INSECURITY: WITHIN THE PAST 12 MONTHS, THE FOOD YOU BOUGHT JUST DIDN'T LAST AND YOU DIDN'T HAVE MONEY TO GET MORE.: NEVER TRUE

## 2021-09-13 SDOH — ECONOMIC STABILITY: FOOD INSECURITY: WITHIN THE PAST 12 MONTHS, YOU WORRIED THAT YOUR FOOD WOULD RUN OUT BEFORE YOU GOT MONEY TO BUY MORE.: NEVER TRUE

## 2021-09-13 ASSESSMENT — PATIENT HEALTH QUESTIONNAIRE - PHQ9
SUM OF ALL RESPONSES TO PHQ QUESTIONS 1-9: 0
1. LITTLE INTEREST OR PLEASURE IN DOING THINGS: 0
2. FEELING DOWN, DEPRESSED OR HOPELESS: 0
SUM OF ALL RESPONSES TO PHQ9 QUESTIONS 1 & 2: 0
SUM OF ALL RESPONSES TO PHQ QUESTIONS 1-9: 0
SUM OF ALL RESPONSES TO PHQ QUESTIONS 1-9: 0

## 2021-09-13 ASSESSMENT — SOCIAL DETERMINANTS OF HEALTH (SDOH): HOW HARD IS IT FOR YOU TO PAY FOR THE VERY BASICS LIKE FOOD, HOUSING, MEDICAL CARE, AND HEATING?: NOT HARD AT ALL

## 2021-09-13 NOTE — LETTER
CONTROLLED SUBSTANCE MEDICATION AGREEMENT     Patient Name: Alee Monroe  Patient YOB: 1965   I understand, that controlled substance medications may be used to help better manage my symptoms and to improve my ability to function at home, work and in social settings. However, I also understand that these medications do have risks, which have been discussed with me, including possible development of physical or psychological dependence. I understand that successful treatment requires mutual trust and honesty between me and my provider. I understand and agree that following this Medication Agreement is necessary in continuing my provider-patient relationship and the success of my treatment plan. Explanation from my Provider: Benefits and Goals of Controlled Substance Medications: There are two potential goals for your treatment: (1) decreased pain and suffering (2) improved daily life functions. There are many possible treatments for your chronic condition(s). Alternatives such as physical therapy, yoga, massage, home daily exercise, meditation, relaxation techniques, injections, chiropractic manipulations, surgery, cognitive therapy, hypnosis and many medications that are not habit-forming may be used. Use of controlled substance medications may be helpful, but they are unlikely to resolve all symptoms or restore all function. Explanation from my Provider: Risks of Controlled Substance Medications:  Opioid pain medications: These medications can lead to problems such as addiction/dependence, sedation, lightheadedness/dizziness, memory issues, falls, constipation, nausea, or vomiting. They may also impair the ability to drive or operate machinery. Additionally, these medications may lower testosterone levels, leading to loss of bone strength, stamina and sex drive.   They may cause problems with breathing, sleep apnea and reduced coughing, which is especially dangerous for patients with lung disease. Overdose or dangerous interactions with alcohol and other medications may occur, leading to death. Hyperalgesia may develop, which means patients receiving opioids for the treatment of pain may become more sensitive to certain painful stimuli, and in some cases, experience pain from ordinarily non-painful stimuli. Women between the ages of 14-53 who could become pregnant should carefully weigh the risks and benefits of opioids with their physicians, as these medications increase the risk of pregnancy complications, including miscarriage,  delivery and stillbirth. It is also possible for babies to be born addicted to opioids. Opioid dependence withdrawal symptoms may include; feelings of uneasiness, increased pain, irritability, belly pain, diarrhea, sweats and goose-flesh. Benzodiazepines and non-benzodiazepine sleep medications: These medications can lead to problems such as addiction/dependence, sedation, fatigue, lightheadedness, dizziness, incoordination, falls, depression, hallucinations, and impaired judgment, memory and concentration. The ability to drive and operate machinery may also be affected. Abnormal sleep-related behaviors have been reported, including sleepwalking, driving, making telephone calls, eating, or having sex while not fully awake. These medications can suppress breathing and worsen sleep apnea, particularly when combined with alcohol or other sedating medications, potentially leading to death. Dependence withdrawal symptoms may include tremors, anxiety, hallucinations and seizures. Stimulants:  Common adverse effects include addiction/dependence, increased blood  pressure and heart rate, decreased appetite, nausea, involuntary weight loss, insomnia,                                                                                                                     Initials:_______   irritability, and headaches.   These risks may increase when these medications are combined with other stimulants, such as caffeine pills or energy drinks, certain weight loss supplements and oral decongestants. Dependence withdrawal symptoms may include depressed mood, loss of interest, suicidal thoughts, anxiety, fatigue, appetite changes and agitation. Testosterone replacement therapy:  Potential side effects include increased risk of stroke and heart attack, blood clots, increased blood pressure, increased cholesterol, enlarged prostate, sleep apnea, irritability/aggression and other mood disorders, and decreased fertility. I agree and understand that I and my prescriber have the following rights and responsibilities regarding my treatment plan:     1. MY RIGHTS:  To be informed of my treatment and medication plan. To be an active participant in my health and wellbeing. 2. MY RESPONSIBILITY AND UNDERSTANDING FOR USE OF MEDICATIONS   I will take medications at the dose and frequency as directed. For my safety, I will not increase or change how I take my medications without the recommendation of my healthcare provider.  I will actively participate in any program recommended by my provider which may improve function, including social, physical, psychological programs.  I will not take my medications with alcohol or other drugs not prescribed to me. I understand that drinking alcohol with my medications increases the chances of side effects, including reduced breathing rate and could lead to personal injury when operating machinery.  I understand that if I have a history of substance use disorders, including alcohol or other illicit drugs, that I may be at increased risk of addiction to my medications.  I agree to notify my provider immediately if I should become pregnant so that my treatment plan can be adjusted.    I agree and understand that I shall only receive controlled substance medications from the prescriber that signed this agreement unless there is written agreement among other prescribers of controlled substances outlining the responsibility of the medications being prescribed.  I understand that the if the controlled medication is not helping to achieve goals, the dosage may be tapered and no longer prescribed. 3. MY RESPONSIBILITY FOR COMMUNICATION / PRESCRIPTION RENEWALS   I agree that all controlled substance medications that I take will be prescribed only by my provider. If another healthcare provider prescribes me medication in an emergency, I will notify my provider within seventy-two (72) hours.  I will arrange for refills at the prescribed interval ONLY during regular office hours. I will not ask for refills earlier than agreed, after-hours, on holidays or weekends. Refills may take up to 72 hours for processing and prescriptions to reach the pharmacy.  I will inform my other health care providers that I am taking these medications and of the existence of this [id number]. In the event of an emergency, I will provide the same information to the emergency department prescribers.  I will keep my provider updated on the pharmacy I am using for controlled medication prescription filling. Initials:_______  4. MY RESPONSIBILITY FOR PROTECTING MEDICATIONS   I will protect my prescriptions and medications. I understand that lost or misplaced prescriptions will not be replaced.  I will keep medications only for my own use and will not share them with others. I will keep all medications away from children.  I agree that if my medications are adjusted or discontinued, I will properly dispose of any remaining medications. I understand that I will be required to dispose of any remaining controlled medications as, directed by my prescriber, prior to being provided with any prescriptions for other controlled medications.   Medication drop box locations can be found at: [email or web address]    5. MY RESPONSIBILITY WITH ILLEGAL DRUGS    I will not use illegal or street drugs or another person's prescription medications not prescribed to me.  If there are identified addiction type symptoms, then referral to a program may be provided by my provider and I agree to follow through with this recommendation. 6. MY RESPONSIBILITY FOR COOPERATION WITH INVESTIGATIONS   I understand that my provider will comply with any applicable law and may discuss my use and/or possible misuse/abuse of controlled substances and alcohol, as appropriate, with any health care provider involved in my care, pharmacist, or legal authority.  I authorize my provider and pharmacy to cooperate fully with law enforcement agencies (as permitted by law) in the investigation of any possible misuse, sale, or other diversion of my controlled substances.  I agree to waive any applicable privilege or right of privacy or confidentiality with respect to these authorizations. 7. PROVIDERS RIGHT TO MONITOR FOR SAFETY: PRESCRIPTION MONITORING / DRUG TESTING   I consent to drug/toxicology screening and will submit to a drug screen upon my providers request to assure I am only taking the prescribed drugs for my safety monitoring. I understand that a drug screen is a laboratory test in which a sample of my urine, blood or saliva is checked to see what drugs I have been taking. This may entail an observed urine specimen, which means that a nurse or other health care provider may watch me provide urine, and I will cooperate if I am asked to provide an observed specimen.  I understand that my provider will check a copy of my State Prescription Monitoring Program () Report in order to safely prescribe medications.  Pill Counts: I consent to pill counts when requested.   I may be asked to bring all my prescribed controlled substance medications, in their original bottles, to all of my scheduled appointments. In addition, my provider may ask me to come to the practice at any time for a random pill count. 8. TERMINATION OF THIS AGREEMENT  For my safety, my prescriber has the right to stop prescribing controlled substance medications and may end this agreement. Initials:_______   Conditions that may result in termination of this agreement:  a. I do not show any improvement in pain, or my activity has not improved. b. I develop rapid tolerance or loss of improvement, as described in my treatment plan.  c. I develop significant side effects from the medication. d. My behavior is not consistent with the responsibilities outlined above, thereby causing safety concerns to continue prescribing controlled substance medications. e. I fail to follow the terms of this agreement. f. Other:____________________________       UNDERSTANDING THIS MEDICATION AGREEMENT:    I have read the above and have had all my questions answered. For chronic disease management, I know that my symptoms can be managed with many types of treatments. A chronic medication trial may be part of my treatment, but I must be an active participant in my care. Medication therapy is only one part of my symptom management plan. In some cases, there may be limited scientific evidence to support the chronic use of certain medications to improve symptoms and daily function. Furthermore, in certain circumstances, there may be scientific information that suggests that the use of chronic controlled substances may worsen my symptoms and increase my risk of unintentional death directly related to this medication therapy. I know that if my provider feels my risk from controlled medications is greater than my benefit, I will have my controlled substance medication(s) compassionately lowered or removed altogether.      I further agree to allow this office to contact my HIPAA contact if there are concerns about my safety and use of the controlled medications. I have agreed to use the prescribed controlled substance medications to me as instructed by my provider and as stated in this Medication Agreement. My initial on each page and my signature below shows that I have read each page and I have had the opportunity to ask questions with answers provided by my provider.     Patient Name (Printed): _____________________________________  Patient Signature:  ______________________   Date: _____________    Prescriber Name (Printed): _______Zayra Reeder MD____________________________  Prescriber Signature: ___  __________________  Date: 9/13/21_____________

## 2021-09-13 NOTE — PROGRESS NOTES
Carly Nguyen   Date ofBirth:  1965    Date of Visit:  9/13/2021    Chief Complaint   Patient presents with    Anxiety    Insomnia       HPI  Patient has anxiety. Patient states Klonopin 1mg po qhs and 1mg po q day prn. Patient states Klonopin is working well. Patient states her anxiety is stable. Patient denies panic attacks. Patient has insomnia. Patient takes Klonopin 1mg po qhs prn. Patient states she has been sleeping good. Review of Systems   Constitutional: Negative for activity change, appetite change, fatigue and unexpected weight change. Respiratory: Negative for chest tightness and shortness of breath. Cardiovascular: Negative for chest pain. Gastrointestinal: Negative for abdominal pain, nausea and vomiting. Neurological: Negative for dizziness and headaches. Psychiatric/Behavioral: Positive for sleep disturbance. Negative for decreased concentration and dysphoric mood. The patient is nervous/anxious. No Known Allergies  Outpatient Medications Marked as Taking for the 9/13/21 encounter (Office Visit) with Latrice Smyth MD   Medication Sig Dispense Refill    clonazePAM (KLONOPIN) 1 MG tablet TAKE ONE TABLET BY MOUTH EVERY NIGHT FOR INSOMNIA AND TAKE ONE TABLET BY MOUTH DAILY AS NEEDED FOR ANXIETY 45 tablet 1    vitamin D (ERGOCALCIFEROL) 1.25 MG (26503 UT) CAPS capsule TAKE ONE CAPSULE BY MOUTH ONCE WEEKLY 4 capsule 4    atorvastatin (LIPITOR) 20 MG tablet TAKE ONE TABLET BY MOUTH ONCE NIGHTLY 30 tablet 4    latanoprost (XALATAN) 0.005 % ophthalmic solution Place 1 drop into both eyes nightly   0         Vitals:    09/13/21 1658   BP: 126/84   Pulse: 68   Temp: 97.9 °F (36.6 °C)   TempSrc: Oral   SpO2: 98%   Weight: 137 lb 6.4 oz (62.3 kg)   Height: 5' 1.5\" (1.562 m)     Body mass index is 25.54 kg/m². Physical Exam  Nursing note reviewed. Constitutional:       General: She is not in acute distress. Appearance: Normal appearance.  She is  VLDL Cholesterol Calcula* 04/15/2021 21     Sodium 04/15/2021 142     Potassium 04/15/2021 3.8     Chloride 04/15/2021 107     CO2 04/15/2021 23     Anion Gap 04/15/2021 12     Glucose 04/15/2021 105*    BUN 04/15/2021 16     CREATININE 04/15/2021 0.9     GFR Non- 04/15/2021 >60     GFR  04/15/2021 >60     Calcium 04/15/2021 9.5     Total Protein 04/15/2021 7.1     Albumin 04/15/2021 4.6     Albumin/Globulin Ratio 04/15/2021 1.8     Total Bilirubin 04/15/2021 0.5     Alkaline Phosphatase 04/15/2021 77     ALT 04/15/2021 16     AST 04/15/2021 23     Globulin 04/15/2021 2.5     Vit D, 25-Hydroxy 04/15/2021 56.0    Office Visit on 04/15/2021   Component Date Value    Hemoglobin A1C 04/15/2021 5.4          Assessment/Plan     1. Primary insomnia  -stable  -Continue clonazePAM (KLONOPIN) 1 MG tablet; TAKE ONE TABLET BY MOUTH EVERY NIGHT FOR INSOMNIA AND TAKE ONE TABLET BY MOUTH DAILY AS NEEDED FOR ANXIETY  Dispense: 45 tablet; Refill: 2    2. Anxiety  -stable  -Continue clonazePAM (KLONOPIN) 1 MG tablet; TAKE ONE TABLET BY MOUTH EVERY NIGHT FOR INSOMNIA AND TAKE ONE TABLET BY MOUTH DAILY AS NEEDED FOR ANXIETY  Dispense: 45 tablet; Refill: 2      Discussed medications with patient, who voiced understanding of their use and indications. All questions answered. Controlled Substance Monitoring:    Acute and Chronic Pain Monitoring:   RX Monitoring 9/13/2021   Attestation -   Periodic Controlled Substance Monitoring No signs of potential drug abuse or diversion identified.        Return in about 3 months (around 12/13/2021) for Annual physical exam.

## 2021-09-20 ASSESSMENT — ENCOUNTER SYMPTOMS
VOMITING: 0
CHEST TIGHTNESS: 0
NAUSEA: 0
SHORTNESS OF BREATH: 0
ABDOMINAL PAIN: 0

## 2021-12-31 DIAGNOSIS — E55.9 VITAMIN D DEFICIENCY: ICD-10-CM

## 2022-01-03 RX ORDER — ERGOCALCIFEROL 1.25 MG/1
CAPSULE ORAL
Qty: 4 CAPSULE | Refills: 4 | Status: SHIPPED | OUTPATIENT
Start: 2022-01-03 | End: 2022-01-17 | Stop reason: SDUPTHER

## 2022-01-08 DIAGNOSIS — E55.9 VITAMIN D DEFICIENCY: ICD-10-CM

## 2022-01-10 RX ORDER — ERGOCALCIFEROL 1.25 MG/1
CAPSULE ORAL
Qty: 4 CAPSULE | Refills: 4 | OUTPATIENT
Start: 2022-01-10

## 2022-01-10 NOTE — TELEPHONE ENCOUNTER
Medication:   Requested Prescriptions     Pending Prescriptions Disp Refills    vitamin D (ERGOCALCIFEROL) 1.25 MG (10017 UT) CAPS capsule [Pharmacy Med Name: VIT D2 (ERGOCAL) 1.25MG(50,000U) CP] 4 capsule 4     Sig: TAKE ONE CAPSULE BY MOUTH ONCE WEEKLY       Last appt: 9/13/2021   Next appt: 1/17/2022    Last OARRS:   RX Monitoring 9/13/2021   Attestation -   Periodic Controlled Substance Monitoring No signs of potential drug abuse or diversion identified.

## 2022-01-14 DIAGNOSIS — Z00.00 ANNUAL PHYSICAL EXAM: ICD-10-CM

## 2022-01-14 DIAGNOSIS — R73.03 PREDIABETES: ICD-10-CM

## 2022-01-14 DIAGNOSIS — E78.2 MIXED HYPERLIPIDEMIA: ICD-10-CM

## 2022-01-14 LAB
BASOPHILS ABSOLUTE: 0 K/UL (ref 0–0.2)
BASOPHILS RELATIVE PERCENT: 0.7 %
EOSINOPHILS ABSOLUTE: 0.1 K/UL (ref 0–0.6)
EOSINOPHILS RELATIVE PERCENT: 1.1 %
HCT VFR BLD CALC: 38.3 % (ref 36–48)
HEMOGLOBIN: 12.3 G/DL (ref 12–16)
LYMPHOCYTES ABSOLUTE: 2.2 K/UL (ref 1–5.1)
LYMPHOCYTES RELATIVE PERCENT: 40.3 %
MCH RBC QN AUTO: 30.5 PG (ref 26–34)
MCHC RBC AUTO-ENTMCNC: 32.1 G/DL (ref 31–36)
MCV RBC AUTO: 95.1 FL (ref 80–100)
MONOCYTES ABSOLUTE: 0.5 K/UL (ref 0–1.3)
MONOCYTES RELATIVE PERCENT: 8.3 %
NEUTROPHILS ABSOLUTE: 2.7 K/UL (ref 1.7–7.7)
NEUTROPHILS RELATIVE PERCENT: 49.6 %
PDW BLD-RTO: 13.5 % (ref 12.4–15.4)
PLATELET # BLD: 255 K/UL (ref 135–450)
PMV BLD AUTO: 9.4 FL (ref 5–10.5)
RBC # BLD: 4.03 M/UL (ref 4–5.2)
WBC # BLD: 5.4 K/UL (ref 4–11)

## 2022-01-15 LAB
A/G RATIO: 1.6 (ref 1.1–2.2)
ALBUMIN SERPL-MCNC: 4.6 G/DL (ref 3.4–5)
ALP BLD-CCNC: 76 U/L (ref 40–129)
ALT SERPL-CCNC: 11 U/L (ref 10–40)
ANION GAP SERPL CALCULATED.3IONS-SCNC: 15 MMOL/L (ref 3–16)
AST SERPL-CCNC: 20 U/L (ref 15–37)
BILIRUB SERPL-MCNC: 0.5 MG/DL (ref 0–1)
BUN BLDV-MCNC: 16 MG/DL (ref 7–20)
CALCIUM SERPL-MCNC: 10.2 MG/DL (ref 8.3–10.6)
CHLORIDE BLD-SCNC: 105 MMOL/L (ref 99–110)
CHOLESTEROL, TOTAL: 236 MG/DL (ref 0–199)
CO2: 23 MMOL/L (ref 21–32)
CREAT SERPL-MCNC: 0.9 MG/DL (ref 0.6–1.1)
ESTIMATED AVERAGE GLUCOSE: 116.9 MG/DL
GFR AFRICAN AMERICAN: >60
GFR NON-AFRICAN AMERICAN: >60
GLUCOSE BLD-MCNC: 104 MG/DL (ref 70–99)
HBA1C MFR BLD: 5.7 %
HDLC SERPL-MCNC: 91 MG/DL (ref 40–60)
LDL CHOLESTEROL CALCULATED: 126 MG/DL
POTASSIUM SERPL-SCNC: 4.3 MMOL/L (ref 3.5–5.1)
SODIUM BLD-SCNC: 143 MMOL/L (ref 136–145)
TOTAL PROTEIN: 7.5 G/DL (ref 6.4–8.2)
TRIGL SERPL-MCNC: 95 MG/DL (ref 0–150)
TSH SERPL DL<=0.05 MIU/L-ACNC: 1.05 UIU/ML (ref 0.27–4.2)
VLDLC SERPL CALC-MCNC: 19 MG/DL

## 2022-01-17 ENCOUNTER — OFFICE VISIT (OUTPATIENT)
Dept: PRIMARY CARE CLINIC | Age: 57
End: 2022-01-17
Payer: COMMERCIAL

## 2022-01-17 VITALS
RESPIRATION RATE: 16 BRPM | TEMPERATURE: 95 F | HEIGHT: 62 IN | WEIGHT: 138.8 LBS | DIASTOLIC BLOOD PRESSURE: 78 MMHG | OXYGEN SATURATION: 98 % | SYSTOLIC BLOOD PRESSURE: 130 MMHG | BODY MASS INDEX: 25.54 KG/M2 | HEART RATE: 85 BPM

## 2022-01-17 DIAGNOSIS — Z00.00 ANNUAL PHYSICAL EXAM: Primary | ICD-10-CM

## 2022-01-17 DIAGNOSIS — F41.9 ANXIETY: ICD-10-CM

## 2022-01-17 DIAGNOSIS — R73.03 PREDIABETES: ICD-10-CM

## 2022-01-17 DIAGNOSIS — Z79.899 MEDICATION MANAGEMENT: ICD-10-CM

## 2022-01-17 DIAGNOSIS — E55.9 VITAMIN D DEFICIENCY: ICD-10-CM

## 2022-01-17 DIAGNOSIS — F51.01 PRIMARY INSOMNIA: ICD-10-CM

## 2022-01-17 DIAGNOSIS — E78.2 MIXED HYPERLIPIDEMIA: ICD-10-CM

## 2022-01-17 LAB
BILIRUBIN, POC: NORMAL
BLOOD URINE, POC: NORMAL
CLARITY, POC: NORMAL
COLOR, POC: NORMAL
GLUCOSE URINE, POC: NORMAL
KETONES, POC: NORMAL
LEUKOCYTE EST, POC: NORMAL
NITRITE, POC: NORMAL
PH, POC: 7.5
PROTEIN, POC: NORMAL
SPECIFIC GRAVITY, POC: 1.02
UROBILINOGEN, POC: 0.2

## 2022-01-17 PROCEDURE — 81002 URINALYSIS NONAUTO W/O SCOPE: CPT | Performed by: INTERNAL MEDICINE

## 2022-01-17 PROCEDURE — 99396 PREV VISIT EST AGE 40-64: CPT | Performed by: INTERNAL MEDICINE

## 2022-01-17 PROCEDURE — G8484 FLU IMMUNIZE NO ADMIN: HCPCS | Performed by: INTERNAL MEDICINE

## 2022-01-17 RX ORDER — CLONAZEPAM 1 MG/1
TABLET ORAL
Qty: 45 TABLET | Refills: 2 | Status: SHIPPED | OUTPATIENT
Start: 2022-01-17 | End: 2022-08-25 | Stop reason: SDUPTHER

## 2022-01-17 RX ORDER — LATANOPROST 50 UG/ML
1 SOLUTION/ DROPS OPHTHALMIC NIGHTLY
Qty: 3 EACH | Refills: 1 | Status: CANCELLED | OUTPATIENT
Start: 2022-01-17 | End: 2022-04-17

## 2022-01-17 RX ORDER — ATORVASTATIN CALCIUM 20 MG/1
TABLET, FILM COATED ORAL
Qty: 90 TABLET | Refills: 1 | Status: SHIPPED | OUTPATIENT
Start: 2022-01-17 | End: 2022-08-25 | Stop reason: SDUPTHER

## 2022-01-17 RX ORDER — ERGOCALCIFEROL 1.25 MG/1
50000 CAPSULE ORAL WEEKLY
Qty: 12 CAPSULE | Refills: 1 | Status: SHIPPED | OUTPATIENT
Start: 2022-01-17 | End: 2022-06-20

## 2022-01-17 NOTE — PROGRESS NOTES
Well Adult Note  Name: Yasmine Jones Date: 2022   MRN: <[id number]> Sex: Female   Age: 61 y.o. Ethnicity: Declined   : 1965 Race: Black /       Chief Complaint   Patient presents with    Annual Exam       Charly Kincaid is here for well adult exam.  History:    Patient presents for annual physical exam.  Pap smear done by gynecology on 2021. Mammogram done on 2021. Patient has hyperlipidemia. Patient states she has not been as compliant with atorvastatin and has been taking it every other day. Patient states she decreases fat and cholesterol except during the holidays. Patient states she stopped intermittent fasting during the holidays. Patient states she weight lifts once a week, does Pilates once a week, yoga once a week, and plans to increase Pilates to 2 times per week. Patient has anxiety. Patient states Klonopin 1mg po qhs and 1mg po q day prn. Patient states her anxiety is stable. Patient denies panic attacks.     Patient has insomnia. Patient takes Klonopin 1mg po qhs prn. Patient states she has been sleeping good. Patient has prediabetes. Patient states she has not been decreasing carbohydrates lately. Review of Systems   Constitutional: Positive for fatigue (paatient states she has fatigue in the winter). Negative for activity change, appetite change, chills, diaphoresis, fever and unexpected weight change. HENT: Negative for congestion, ear discharge, ear pain, facial swelling, hearing loss, mouth sores, nosebleeds, postnasal drip, rhinorrhea, sinus pressure, sinus pain, sneezing, sore throat, tinnitus, trouble swallowing and voice change. Eyes: Negative for photophobia, pain, discharge, redness, itching and visual disturbance. Respiratory: Negative for apnea, cough, choking, chest tightness, shortness of breath and wheezing. Cardiovascular: Negative for chest pain, palpitations and leg swelling.    Gastrointestinal: Negative for abdominal distention, abdominal pain, anal bleeding, blood in stool, constipation, diarrhea, nausea, rectal pain and vomiting. Endocrine: Negative for cold intolerance and heat intolerance. Genitourinary: Negative for decreased urine volume, difficulty urinating, dysuria, flank pain, frequency, genital sores, hematuria, menstrual problem, pelvic pain, urgency, vaginal bleeding, vaginal discharge and vaginal pain. Musculoskeletal: Negative for arthralgias, back pain, gait problem, joint swelling, myalgias, neck pain and neck stiffness. Skin: Negative for rash and wound. Neurological: Negative for dizziness, tremors, seizures, syncope, facial asymmetry, speech difficulty, weakness, light-headedness, numbness and headaches. Hematological: Negative for adenopathy. Does not bruise/bleed easily. Psychiatric/Behavioral: Positive for sleep disturbance. Negative for decreased concentration and dysphoric mood. The patient is nervous/anxious. All other systems reviewed and are negative. No Known Allergies      Prior to Visit Medications    Medication Sig Taking?  Authorizing Provider   atorvastatin (LIPITOR) 20 MG tablet TAKE ONE TABLET BY MOUTH ONCE NIGHTLY Yes Jose Alfredo Monk MD   clonazePAM (KLONOPIN) 1 MG tablet TAKE ONE TABLET BY MOUTH EVERY NIGHT FOR INSOMNIA AND TAKE ONE TABLET BY MOUTH DAILY AS NEEDED FOR ANXIETY Yes Jose Alfredo Monk MD   vitamin D (ERGOCALCIFEROL) 1.25 MG (52054 UT) CAPS capsule Take 1 capsule by mouth once a week Yes Jose Alfredo Monk MD   latanoprost (XALATAN) 0.005 % ophthalmic solution Place 1 drop into both eyes nightly  Yes Historical Provider, MD         Past Medical History:   Diagnosis Date    Acid reflux     Allergic rhinitis     Anxiety     Essential hypertension 7/20/2018    Glaucoma     bilateral    Hyperlipidemia     borderline     Insomnia        Past Surgical History:   Procedure Laterality Date    BREAST BIOPSY Right 03/31/2016    BENIGN    BUNIONECTOMY Bilateral 2012, 2013    COLONOSCOPY  01/30/2014    Jakobkaya, Dawson    TUBAL LIGATION  2001    WISDOM TOOTH EXTRACTION Bilateral teenager    x 4         Family History   Problem Relation Age of Onset    Diabetes Mother     High Blood Pressure Mother     Cancer Mother         endometrial    Heart Disease Maternal Grandmother     High Blood Pressure Maternal Grandmother     Stroke Maternal Grandmother     Cancer Maternal Grandfather         throat       Social History     Tobacco Use    Smoking status: Never Smoker    Smokeless tobacco: Never Used   Vaping Use    Vaping Use: Never used   Substance Use Topics    Alcohol use: Yes     Alcohol/week: 6.0 standard drinks     Types: 6 Glasses of wine per week    Drug use: No       Objective   /78   Pulse 85   Temp 95 °F (35 °C)   Resp 16   Ht 5' 1.5\" (1.562 m)   Wt 138 lb 12.8 oz (63 kg)   SpO2 98%   BMI 25.80 kg/m²   Wt Readings from Last 3 Encounters:   01/17/22 138 lb 12.8 oz (63 kg)   09/13/21 137 lb 6.4 oz (62.3 kg)   08/02/21 135 lb 1.6 oz (61.3 kg)     There were no vitals filed for this visit. Physical Exam  Constitutional:       General: She is not in acute distress. Appearance: Normal appearance. HENT:      Head: Normocephalic and atraumatic. Right Ear: Tympanic membrane, ear canal and external ear normal.      Left Ear: Tympanic membrane, ear canal and external ear normal.      Nose: Nose normal.   Eyes:      General: Lids are normal.      Extraocular Movements: Extraocular movements intact. Conjunctiva/sclera: Conjunctivae normal.      Pupils: Pupils are equal, round, and reactive to light. Neck:      Thyroid: No thyromegaly. Vascular: No carotid bruit. Cardiovascular:      Rate and Rhythm: Normal rate and regular rhythm. Heart sounds: Normal heart sounds, S1 normal and S2 normal. No murmur heard.       Pulmonary:      Effort: Pulmonary effort is normal.      Breath sounds: Normal breath sounds. Abdominal:      General: Bowel sounds are normal.      Palpations: Abdomen is soft. There is no hepatomegaly or splenomegaly. Tenderness: There is no abdominal tenderness. Musculoskeletal:      Right shoulder: No tenderness. Normal range of motion. Left shoulder: No tenderness. Normal range of motion. Right elbow: No tenderness. Left elbow: No tenderness. Right wrist: No swelling or tenderness. Left wrist: No swelling or tenderness. Right hand: No swelling or tenderness. Left hand: No swelling or tenderness. Cervical back: Neck supple. No tenderness. Thoracic back: No tenderness. Lumbar back: No tenderness. Normal range of motion. Right hip: No tenderness. Left hip: No tenderness. Right knee: No tenderness. Left knee: No tenderness. Right lower leg: No edema. Left lower leg: No edema. Right ankle: No swelling. No tenderness. Left ankle: No swelling. No tenderness. Right foot: No swelling. Left foot: No swelling. Lymphadenopathy:      Head:      Right side of head: No submandibular adenopathy. Left side of head: No submandibular adenopathy. Skin:     General: Skin is warm and dry. Neurological:      Mental Status: She is alert and oriented to person, place, and time. Gait: Gait normal.      Deep Tendon Reflexes: Reflexes are normal and symmetric.    Psychiatric:         Attention and Perception: Attention normal.         Mood and Affect: Mood normal.         Speech: Speech normal.             Lab Review   Orders Only on 01/14/2022   Component Date Value    Hemoglobin A1C 01/14/2022 5.7     eAG 01/14/2022 116.9     TSH 01/14/2022 1.05     Cholesterol, Total 01/14/2022 236*    Triglycerides 01/14/2022 95     HDL 01/14/2022 91*    LDL Calculated 01/14/2022 126*    VLDL Cholesterol Calcula* 01/14/2022 19     WBC 01/14/2022 5.4     RBC 01/14/2022 4.03     Hemoglobin 01/14/2022 12.3     Hematocrit 01/14/2022 38.3     MCV 01/14/2022 95.1     MCH 01/14/2022 30.5     MCHC 01/14/2022 32.1     RDW 01/14/2022 13.5     Platelets 94/05/8411 255     MPV 01/14/2022 9.4     Neutrophils % 01/14/2022 49.6     Lymphocytes % 01/14/2022 40.3     Monocytes % 01/14/2022 8.3     Eosinophils % 01/14/2022 1.1     Basophils % 01/14/2022 0.7     Neutrophils Absolute 01/14/2022 2.7     Lymphocytes Absolute 01/14/2022 2.2     Monocytes Absolute 01/14/2022 0.5     Eosinophils Absolute 01/14/2022 0.1     Basophils Absolute 01/14/2022 0.0     Sodium 01/14/2022 143     Potassium 01/14/2022 4.3     Chloride 01/14/2022 105     CO2 01/14/2022 23     Anion Gap 01/14/2022 15     Glucose 01/14/2022 104*    BUN 01/14/2022 16     CREATININE 01/14/2022 0.9     GFR Non- 01/14/2022 >60     GFR  01/14/2022 >60     Calcium 01/14/2022 10.2     Total Protein 01/14/2022 7.5     Albumin 01/14/2022 4.6     Albumin/Globulin Ratio 01/14/2022 1.6     Total Bilirubin 01/14/2022 0.5     Alkaline Phosphatase 01/14/2022 76     ALT 01/14/2022 11     AST 01/14/2022 20      Lab Results   Component Value Date    VITD25 56.0 04/15/2021     Assessment   Plan   1. Annual physical exam  - POCT Urinalysis no Micro  - comprehensive metabolic panel, CBC auto differential, Lipid panel, and TSH done on 1/14/2022  - Mammogram done 1/27/21  - Pap smear done on 2/4/21  - Colonoscopy done on 5/22/20  - Tdap done on 8/17/15  - Patient declines flu shot  - Patient declines Shingrix  -COVID-19 vaccine done on 3/17/2021, 4/14/2021, and booster done on 11/18/2021    2. Mixed hyperlipidemia  -Total cholesterol and LDL are little high  -Patient has not been as compliant with taking atorvastatin and has only been taking it every other night  -Increase atorvastatin (LIPITOR) 20 MG tablet; TAKE ONE TABLET BY MOUTH ONCE NIGHTLY  Dispense: 90 tablet; Refill: 1    3.  Primary insomnia  -Stable  -Continue clonazePAM (KLONOPIN) 1 MG tablet; TAKE ONE TABLET BY MOUTH EVERY NIGHT FOR INSOMNIA AND TAKE ONE TABLET BY MOUTH DAILY AS NEEDED FOR ANXIETY  Dispense: 45 tablet; Refill: 2    4. Anxiety  -Stable  -Continue clonazePAM (KLONOPIN) 1 MG tablet; TAKE ONE TABLET BY MOUTH EVERY NIGHT FOR INSOMNIA AND TAKE ONE TABLET BY MOUTH DAILY AS NEEDED FOR ANXIETY  Dispense: 45 tablet; Refill: 2    5. Vitamin D deficiency  - stable  - continue vitamin D (ERGOCALCIFEROL) 1.25 MG (18426 UT) CAPS capsule; Take 1 capsule by mouth once a week  Dispense: 12 capsule; Refill: 1    6. Medication management  - Drug Panel-PM-HI Res-UR Interp-A    7.  Prediabetes  -stable  -Low carbohydrate diet  -Regular aerobic exercise      Personalized Preventive Plan   Current Health Maintenance Status  Immunization History   Administered Date(s) Administered    COVID-19, Jonathan Hannah, Primary or Immunocompromised, PF, 100mcg/0.5mL 03/17/2021, 04/14/2021, 11/18/2021    Tdap (Boostrix, Adacel) 08/17/2015        Health Maintenance   Topic Date Due    Flu vaccine (1) 01/17/2023 (Originally 9/1/2021)    Shingles Vaccine (1 of 2) 01/17/2023 (Originally 11/14/2014)    Depression Screen  09/13/2022    A1C test (Diabetic or Prediabetic)  01/14/2023    Lipid screen  01/14/2023    Potassium monitoring  01/14/2023    Creatinine monitoring  01/14/2023    Breast cancer screen  01/27/2023    Cervical cancer screen  02/04/2024    DTaP/Tdap/Td vaccine (2 - Td or Tdap) 08/17/2025    Colon cancer screen colonoscopy  05/22/2030    COVID-19 Vaccine  Completed    Hepatitis C screen  Completed    HIV screen  Completed    Hepatitis A vaccine  Aged Out    Hepatitis B vaccine  Aged Out    Hib vaccine  Aged Out    Meningococcal (ACWY) vaccine  Aged Out    Pneumococcal 0-64 years Vaccine  Aged Out     Recommendations for [employer] Due: see orders and patient instructions/AVS.    Controlled Substance Monitoring:    Acute and Chronic Pain Monitoring:   RX Monitoring 1/17/2022   Attestation -   Periodic Controlled Substance Monitoring No signs of potential drug abuse or diversion identified. Return in about 3 months (around 4/17/2022) for insomnia, anxiety, hypertension, and hyperlipidemia.

## 2022-01-17 NOTE — PATIENT INSTRUCTIONS
Well Visit, Women 48 to 72: Care Instructions  Overview     Well visits can help you stay healthy. Your doctor has checked your overall health and may have suggested ways to take good care of yourself. Your doctor also may have recommended tests. At home, you can help prevent illness with healthy eating, regular exercise, and other steps. Follow-up care is a key part of your treatment and safety. Be sure to make and go to all appointments, and call your doctor if you are having problems. It's also a good idea to know your test results and keep a list of the medicines you take. How can you care for yourself at home? · Get screening tests that you and your doctor decide on. Screening helps find diseases before any symptoms appear. · Eat healthy foods. Choose fruits, vegetables, whole grains, protein, and low-fat dairy foods. Limit fat, especially saturated fat. Reduce salt in your diet. · Limit alcohol. Have no more than 1 drink a day or 7 drinks a week. · Get at least 30 minutes of exercise on most days of the week. Walking is a good choice. You also may want to do other activities, such as running, swimming, cycling, or playing tennis or team sports. · Reach and stay at a healthy weight. This will lower your risk for many problems, such as obesity, diabetes, heart disease, and high blood pressure. · Do not smoke. Smoking can make health problems worse. If you need help quitting, talk to your doctor about stop-smoking programs and medicines. These can increase your chances of quitting for good. · Care for your mental health. It is easy to get weighed down by worry and stress. Learn strategies to manage stress, like deep breathing and mindfulness, and stay connected with your family and community. If you find you often feel sad or hopeless, talk with your doctor. Treatment can help. · Talk to your doctor about whether you have any risk factors for sexually transmitted infections (STIs).  You can help prevent STIs if you wait to have sex with a new partner (or partners) until you've each been tested for STIs. It also helps if you use condoms (male or female condoms) and if you limit your sex partners to one person who only has sex with you. Vaccines are available for some STIs. · If you think you may have a problem with alcohol or drug use, talk to your doctor. This includes prescription medicines (such as amphetamines and opioids) and illegal drugs (such as cocaine and methamphetamine). Your doctor can help you figure out what type of treatment is best for you. · Protect your skin from too much sun. When you're outdoors from 10 a.m. to 4 p.m., stay in the shade or cover up with clothing and a hat with a wide brim. Wear sunglasses that block UV rays. Even when it's cloudy, put broad-spectrum sunscreen (SPF 30 or higher) on any exposed skin. · See a dentist one or two times a year for checkups and to have your teeth cleaned. · Wear a seat belt in the car. When should you call for help? Watch closely for changes in your health, and be sure to contact your doctor if you have any problems or symptoms that concern you. Where can you learn more? Go to [email or web address] and sign in to your [employer] account. Enter V677 in the [facility] box to learn more about \"Well Visit, Women 50 to 72: Care Instructions. \"     If you do not have an account, please click on the \"Sign Up Now\" link. Current as of: October 6, 2021               Content Version: 13.1  © 7112-3109 [employer]. Care instructions adapted under license by [facility] ([facility]). If you have questions about a medical condition or this instruction, always ask your healthcare professional. [place] [address] any warranty or liability for your use of this information.

## 2022-01-20 LAB
6-ACETYLMORPHINE: NOT DETECTED
7-AMINOCLONAZEPAM: PRESENT
ALPHA-OH-ALPRAZOLAM: NOT DETECTED
ALPHA-OH-MIDAZOLAM, URINE: NOT DETECTED
ALPRAZOLAM: NOT DETECTED
AMPHETAMINE: NOT DETECTED
BARBITURATES: NOT DETECTED
BENZOYLECGONINE: NOT DETECTED
BUPRENORPHINE: NOT DETECTED
CARISOPRODOL: NOT DETECTED
CLONAZEPAM: NOT DETECTED
CODEINE: NOT DETECTED
CREATININE URINE: 47.1 MG/DL (ref 20–400)
DIAZEPAM: NOT DETECTED
DRUGS EXPECTED: NORMAL
EER PAIN MGT DRUG PANEL, HIGH RES/EMIT U: NORMAL
ETHYL GLUCURONIDE: PRESENT
FENTANYL: NOT DETECTED
GABAPENTIN: NOT DETECTED
HYDROCODONE: NOT DETECTED
HYDROMORPHONE: NOT DETECTED
LORAZEPAM: NOT DETECTED
MARIJUANA METABOLITE: PRESENT
MDA: NOT DETECTED
MDEA: NOT DETECTED
MDMA URINE: NOT DETECTED
MEPERIDINE: NOT DETECTED
METHADONE: NOT DETECTED
METHAMPHETAMINE: NOT DETECTED
METHYLPHENIDATE: NOT DETECTED
MIDAZOLAM: NOT DETECTED
MORPHINE: NOT DETECTED
NALOXONE: NOT DETECTED
NORBUPRENORPHINE, FREE: NOT DETECTED
NORDIAZEPAM: NOT DETECTED
NORFENTANYL: NOT DETECTED
NORHYDROCODONE, URINE: NOT DETECTED
NOROXYCODONE: NOT DETECTED
NOROXYMORPHONE, URINE: NOT DETECTED
OXAZEPAM: NOT DETECTED
OXYCODONE: NOT DETECTED
OXYMORPHONE: NOT DETECTED
PAIN MANAGEMENT DRUG PANEL: NORMAL
PAIN MANAGEMENT DRUG PANEL: NORMAL
PCP: NOT DETECTED
PHENTERMINE: NOT DETECTED
PREGABALIN: NOT DETECTED
TAPENTADOL, URINE: NOT DETECTED
TAPENTADOL-O-SULFATE, URINE: NOT DETECTED
TEMAZEPAM: NOT DETECTED
TRAMADOL: NOT DETECTED
ZOLPIDEM: NOT DETECTED

## 2022-01-25 ASSESSMENT — ENCOUNTER SYMPTOMS
DIARRHEA: 0
APNEA: 0
VOICE CHANGE: 0
NAUSEA: 0
EYE REDNESS: 0
SORE THROAT: 0
EYE PAIN: 0
ABDOMINAL PAIN: 0
COUGH: 0
EYE ITCHING: 0
TROUBLE SWALLOWING: 0
VOMITING: 0
FACIAL SWELLING: 0
WHEEZING: 0
RECTAL PAIN: 0
PHOTOPHOBIA: 0
CHEST TIGHTNESS: 0
BACK PAIN: 0
SHORTNESS OF BREATH: 0
ABDOMINAL DISTENTION: 0
ANAL BLEEDING: 0
CONSTIPATION: 0
CHOKING: 0
BLOOD IN STOOL: 0
RHINORRHEA: 0
EYE DISCHARGE: 0
SINUS PRESSURE: 0
SINUS PAIN: 0

## 2022-06-20 DIAGNOSIS — E55.9 VITAMIN D DEFICIENCY: ICD-10-CM

## 2022-06-20 RX ORDER — ERGOCALCIFEROL 1.25 MG/1
CAPSULE ORAL
Qty: 12 CAPSULE | Refills: 1 | Status: SHIPPED | OUTPATIENT
Start: 2022-06-20 | End: 2022-08-25 | Stop reason: SDUPTHER

## 2022-06-20 NOTE — TELEPHONE ENCOUNTER
Medication:   Requested Prescriptions     Pending Prescriptions Disp Refills    vitamin D (ERGOCALCIFEROL) 1.25 MG (41550 UT) CAPS capsule [Pharmacy Med Name: VIT D2 (ERGOCAL) 1.25MG(50,000U) CP] 12 capsule 1     Sig: TAKE ONE CAPSULE BY MOUTH ONCE WEEKLY     Last Filled: 1.17.22    Last appt: 1/17/2022   Next appt: Visit date not found    Last OARRS:   RX Monitoring 1/17/2022   Attestation -   Periodic Controlled Substance Monitoring No signs of potential drug abuse or diversion identified.

## 2022-08-18 DIAGNOSIS — F51.01 PRIMARY INSOMNIA: ICD-10-CM

## 2022-08-18 DIAGNOSIS — F41.9 ANXIETY: ICD-10-CM

## 2022-08-18 RX ORDER — CLONAZEPAM 1 MG/1
TABLET ORAL
Qty: 45 TABLET | OUTPATIENT
Start: 2022-08-18

## 2022-08-18 NOTE — TELEPHONE ENCOUNTER
Medication:   Requested Prescriptions     Pending Prescriptions Disp Refills    clonazePAM (KLONOPIN) 1 MG tablet [Pharmacy Med Name: clonazePAM 1 MG TABLET] 45 tablet      Sig: TAKE ONE TABLET BY MOUTH ONCE NIGHTLY FOR INSOMNIA AND TAKE ONE TABLET DAILY AS NEEDED FOR ANXIETY        Last Filled:      Patient Phone Number: [phone] (home)     Last appt: 1/17/2022   Next appt: Visit date not found    Last OARRS:   RX Monitoring 1/17/2022   Attestation -   Periodic Controlled Substance Monitoring No signs of potential drug abuse or diversion identified.        Preferred Pharmacy:   [facility] [id number] - [id number], [address] [place] [address] [phone]  [facility]  Phone: [phone] Fax: [phone]

## 2022-08-25 ENCOUNTER — OFFICE VISIT (OUTPATIENT)
Dept: PRIMARY CARE CLINIC | Age: 57
End: 2022-08-25
Payer: COMMERCIAL

## 2022-08-25 VITALS
BODY MASS INDEX: 25.62 KG/M2 | SYSTOLIC BLOOD PRESSURE: 106 MMHG | OXYGEN SATURATION: 96 % | HEART RATE: 87 BPM | DIASTOLIC BLOOD PRESSURE: 70 MMHG | WEIGHT: 137.8 LBS

## 2022-08-25 DIAGNOSIS — E78.2 MIXED HYPERLIPIDEMIA: ICD-10-CM

## 2022-08-25 DIAGNOSIS — E55.9 VITAMIN D DEFICIENCY: ICD-10-CM

## 2022-08-25 DIAGNOSIS — E78.2 MIXED HYPERLIPIDEMIA: Primary | ICD-10-CM

## 2022-08-25 DIAGNOSIS — Z79.899 MEDICATION MANAGEMENT: ICD-10-CM

## 2022-08-25 DIAGNOSIS — F41.9 ANXIETY: ICD-10-CM

## 2022-08-25 DIAGNOSIS — R73.03 PREDIABETES: ICD-10-CM

## 2022-08-25 DIAGNOSIS — F51.01 PRIMARY INSOMNIA: ICD-10-CM

## 2022-08-25 LAB
A/G RATIO: 1.7 (ref 1.1–2.2)
ALBUMIN SERPL-MCNC: 4.5 G/DL (ref 3.4–5)
ALP BLD-CCNC: 79 U/L (ref 40–129)
ALT SERPL-CCNC: 10 U/L (ref 10–40)
ANION GAP SERPL CALCULATED.3IONS-SCNC: 12 MMOL/L (ref 3–16)
AST SERPL-CCNC: 19 U/L (ref 15–37)
BILIRUB SERPL-MCNC: 0.5 MG/DL (ref 0–1)
BUN BLDV-MCNC: 18 MG/DL (ref 7–20)
CALCIUM SERPL-MCNC: 10.2 MG/DL (ref 8.3–10.6)
CHLORIDE BLD-SCNC: 104 MMOL/L (ref 99–110)
CHOLESTEROL, TOTAL: 216 MG/DL (ref 0–199)
CO2: 26 MMOL/L (ref 21–32)
CREAT SERPL-MCNC: 1 MG/DL (ref 0.6–1.1)
GFR AFRICAN AMERICAN: >60
GFR NON-AFRICAN AMERICAN: 57
GLUCOSE BLD-MCNC: 99 MG/DL (ref 70–99)
HDLC SERPL-MCNC: 76 MG/DL (ref 40–60)
LDL CHOLESTEROL CALCULATED: 119 MG/DL
POTASSIUM SERPL-SCNC: 4.2 MMOL/L (ref 3.5–5.1)
SODIUM BLD-SCNC: 142 MMOL/L (ref 136–145)
TOTAL PROTEIN: 7.1 G/DL (ref 6.4–8.2)
TRIGL SERPL-MCNC: 104 MG/DL (ref 0–150)
VLDLC SERPL CALC-MCNC: 21 MG/DL

## 2022-08-25 PROCEDURE — 99214 OFFICE O/P EST MOD 30 MIN: CPT | Performed by: INTERNAL MEDICINE

## 2022-08-25 PROCEDURE — 3017F COLORECTAL CA SCREEN DOC REV: CPT | Performed by: INTERNAL MEDICINE

## 2022-08-25 PROCEDURE — G8419 CALC BMI OUT NRM PARAM NOF/U: HCPCS | Performed by: INTERNAL MEDICINE

## 2022-08-25 PROCEDURE — G8427 DOCREV CUR MEDS BY ELIG CLIN: HCPCS | Performed by: INTERNAL MEDICINE

## 2022-08-25 PROCEDURE — 1036F TOBACCO NON-USER: CPT | Performed by: INTERNAL MEDICINE

## 2022-08-25 RX ORDER — ATORVASTATIN CALCIUM 20 MG/1
TABLET, FILM COATED ORAL
Qty: 90 TABLET | Refills: 1 | Status: SHIPPED | OUTPATIENT
Start: 2022-08-25

## 2022-08-25 RX ORDER — TRAZODONE HYDROCHLORIDE 50 MG/1
50 TABLET ORAL NIGHTLY PRN
Qty: 30 TABLET | Refills: 0 | Status: SHIPPED | OUTPATIENT
Start: 2022-08-25 | End: 2022-10-03

## 2022-08-25 RX ORDER — CLONAZEPAM 1 MG/1
TABLET ORAL
Qty: 45 TABLET | Refills: 2 | Status: SHIPPED | OUTPATIENT
Start: 2022-08-25 | End: 2023-04-02

## 2022-08-25 RX ORDER — ERGOCALCIFEROL 1.25 MG/1
CAPSULE ORAL
Qty: 12 CAPSULE | Refills: 1 | Status: SHIPPED | OUTPATIENT
Start: 2022-08-25

## 2022-08-25 ASSESSMENT — PATIENT HEALTH QUESTIONNAIRE - PHQ9
5. POOR APPETITE OR OVEREATING: 0
SUM OF ALL RESPONSES TO PHQ QUESTIONS 1-9: 0
SUM OF ALL RESPONSES TO PHQ QUESTIONS 1-9: 0
1. LITTLE INTEREST OR PLEASURE IN DOING THINGS: 0
SUM OF ALL RESPONSES TO PHQ QUESTIONS 1-9: 0
10. IF YOU CHECKED OFF ANY PROBLEMS, HOW DIFFICULT HAVE THESE PROBLEMS MADE IT FOR YOU TO DO YOUR WORK, TAKE CARE OF THINGS AT HOME, OR GET ALONG WITH OTHER PEOPLE: 0
SUM OF ALL RESPONSES TO PHQ QUESTIONS 1-9: 0
8. MOVING OR SPEAKING SO SLOWLY THAT OTHER PEOPLE COULD HAVE NOTICED. OR THE OPPOSITE, BEING SO FIGETY OR RESTLESS THAT YOU HAVE BEEN MOVING AROUND A LOT MORE THAN USUAL: 0
2. FEELING DOWN, DEPRESSED OR HOPELESS: 0
9. THOUGHTS THAT YOU WOULD BE BETTER OFF DEAD, OR OF HURTING YOURSELF: 0
7. TROUBLE CONCENTRATING ON THINGS, SUCH AS READING THE NEWSPAPER OR WATCHING TELEVISION: 0
4. FEELING TIRED OR HAVING LITTLE ENERGY: 0
6. FEELING BAD ABOUT YOURSELF - OR THAT YOU ARE A FAILURE OR HAVE LET YOURSELF OR YOUR FAMILY DOWN: 0
3. TROUBLE FALLING OR STAYING ASLEEP: 0
SUM OF ALL RESPONSES TO PHQ9 QUESTIONS 1 & 2: 0

## 2022-08-25 NOTE — PROGRESS NOTES
Mya Buenrostro   Date ofBirth:  1965    Date of Visit:  8/25/2022    Chief Complaint   Patient presents with    Insomnia    Anxiety    Hypertension    Cholesterol Problem       HPI  Patient has hyperlipidemia. Patient states she has not been as compliant with atorvastatin and has been taking it every other day. Patient states she decreases fat and cholesterol except during the holidays. Patient states she stopped intermittent fasting during the holidays. Patient states she weight lifts once a week, does Pilates once a week, yoga once a week, and plans to increase Pilates to 2 times per week. Patient has anxiety. Patient states Klonopin 1mg po qhs and 1mg po q day prn. Patient states her anxiety is stable. Patient denies panic attacks. Patient has insomnia. Patient takes Klonopin 1mg po qhs prn. Patient states she has been sleeping good. Patient has prediabetes. Patient states she has not been decreasing carbohydrates lately. Cutting back on fried foods and has increased exercise working with a training for strength 2 tiems per week walks in the evening 1 mile 2 times per week and yoga once a week. Klonopin as needed. Anxiety is ok insomnia is up and down. Doesn't always sleep through the night. Patient states Melatonin makes her dream crazy    Prediabetes tries to decrease carb  Atorv 20mg regular    Review of Systems   Constitutional:  Negative for activity change, appetite change, chills, fatigue, fever and unexpected weight change. HENT:  Negative for congestion, postnasal drip, rhinorrhea, sinus pressure and sore throat. Eyes:  Negative for visual disturbance. Respiratory:  Negative for cough, chest tightness, shortness of breath and wheezing. Cardiovascular:  Negative for chest pain, palpitations and leg swelling. Gastrointestinal:  Negative for abdominal pain, blood in stool, constipation, diarrhea, nausea and vomiting.    Genitourinary:  Negative for dysuria, frequency and hematuria. Musculoskeletal:  Negative for arthralgias and myalgias. Skin:  Negative for rash. Neurological:  Negative for dizziness, tremors, syncope, weakness, light-headedness, numbness and headaches. Psychiatric/Behavioral:  Positive for sleep disturbance. Negative for decreased concentration and dysphoric mood. The patient is nervous/anxious. No Known Allergies  Outpatient Medications Marked as Taking for the 8/25/22 encounter (Office Visit) with Fritzi Fothergill, MD   Medication Sig Dispense Refill    traZODone (DESYREL) 50 MG tablet Take 1 tablet by mouth nightly as needed for Sleep 30 tablet 0    clonazePAM (KLONOPIN) 1 MG tablet TAKE ONE TABLET BY MOUTH EVERY NIGHT FOR INSOMNIA AND TAKE ONE TABLET BY MOUTH DAILY AS NEEDED FOR ANXIETY 45 tablet 2    atorvastatin (LIPITOR) 20 MG tablet TAKE ONE TABLET BY MOUTH ONCE NIGHTLY 90 tablet 1    vitamin D (ERGOCALCIFEROL) 1.25 MG (80984 UT) CAPS capsule TAKE ONE CAPSULE BY MOUTH ONCE WEEKLY 12 capsule 1    latanoprost (XALATAN) 0.005 % ophthalmic solution Place 1 drop into both eyes nightly   0         Vitals:    08/25/22 0849   BP: 106/70   Pulse: 87   SpO2: 96%   Weight: 137 lb 12.8 oz (62.5 kg)     Body mass index is 25.62 kg/m². Physical Exam  Nursing note reviewed. Constitutional:       General: She is not in acute distress. Appearance: Normal appearance. She is well-developed. Eyes:      General: Lids are normal.      Extraocular Movements: Extraocular movements intact. Conjunctiva/sclera: Conjunctivae normal.      Pupils: Pupils are equal, round, and reactive to light. Neck:      Thyroid: No thyromegaly. Vascular: No carotid bruit. Cardiovascular:      Rate and Rhythm: Normal rate and regular rhythm. Heart sounds: Normal heart sounds, S1 normal and S2 normal. No murmur heard. No friction rub. No gallop. Pulmonary:      Effort: Pulmonary effort is normal. No respiratory distress.       Breath sounds: Normal breath sounds. No wheezing, rhonchi or rales. Abdominal:      General: Bowel sounds are normal. There is no distension. Palpations: Abdomen is soft. Tenderness: There is no abdominal tenderness. Musculoskeletal:      Cervical back: Neck supple. Right lower leg: No edema. Left lower leg: No edema. Lymphadenopathy:      Head:      Right side of head: No submandibular adenopathy. Left side of head: No submandibular adenopathy. Neurological:      Mental Status: She is alert and oriented to person, place, and time. Psychiatric:         Mood and Affect: Mood normal.         No results found for this visit on 08/25/22. Lab Review   No visits with results within 6 Month(s) from this visit.    Latest known visit with results is:   Office Visit on 01/17/2022   Component Date Value    Glucose, UA POC 01/17/2022 neg     Bilirubin, UA 01/17/2022 neg     Ketones, UA 01/17/2022 neg     Spec Grav, UA 01/17/2022 1.025     Blood, UA POC 01/17/2022 neg     pH, UA 01/17/2022 7.5     Protein, UA POC 01/17/2022 neg     Urobilinogen, UA 01/17/2022 0.2     Leukocytes, UA 01/17/2022 neg     Nitrite, UA 01/17/2022 neg     Drugs Expected 01/17/2022 SEE PAPER     Pain Management Drug Pan* 01/17/2022 See Note     Creatinine, Ur 01/17/2022 47.1     Codeine 01/17/2022 Not Detected     Morphine 01/17/2022 Not Detected     6-Acetylmorphine 01/17/2022 Not Detected     Oxycodone 01/17/2022 Not Detected     Noroxycodone 01/17/2022 Not Detected     Oxymorphone 01/17/2022 Not Detected     Noroxymorphone, Urine 01/17/2022 Not Detected     Hydrocodone 01/17/2022 Not Detected     Norhydrocodone, Urine 01/17/2022 Not Detected     Hydromorphone 01/17/2022 Not Detected     Naloxone 01/17/2022 Not Detected     Buprenorphine 01/17/2022 Not Detected     Norbuprenorphine 01/17/2022 Not Detected     Fentanyl 01/17/2022 Not Detected     Norfentanyl 01/17/2022 Not Detected     Meperidine 01/17/2022 Not Detected Tapentadol, Urine 01/17/2022 Not Detected     Tapentadol-O-Sulfate, Ur* 01/17/2022 Not Detected     Methadone 01/17/2022 Not Detected     Tramadol 01/17/2022 Not Detected     Amphetamine 01/17/2022 Not Detected     Methamphetamine 01/17/2022 Not Detected     MDMA, Urine 01/17/2022 Not Detected     MDA 01/17/2022 Not Detected     MDEA 01/17/2022 Not Detected     Methylphenidate 01/17/2022 Not Detected     Phentermine 01/17/2022 Not Detected     Benzoylecgonine 01/17/2022 Not Detected     Alprazolam 01/17/2022 Not Detected     Alpha-OH-alprazolam 01/17/2022 Not Detected     Clonazepam 01/17/2022 Not Detected     7-aminoclonazepam 01/17/2022 Present     Diazepam 01/17/2022 Not Detected     NORDIAZEPAM 01/17/2022 Not Detected     OXAZEPAM 01/17/2022 Not Detected     TEMAZEPAM 01/17/2022 Not Detected     Lorazepam 01/17/2022 Not Detected     Midazolam 01/17/2022 Not Detected     Zolpidem 01/17/2022 Not Detected     Gabapentin 01/17/2022 Not Detected     Pregabalin 01/17/2022 Not Detected     Alpha-OH-Midazolam, Urine 01/17/2022 Not Detected     Barbiturates 01/17/2022 Not Detected     Ethyl Glucuronide 01/17/2022 Present     Marijuana Metabolite 01/17/2022 Present     PCP 01/17/2022 Not Detected     CARISOPRODOL 01/17/2022 Not Detected     Pain Management Drug Pan* 01/17/2022 See Below     [facility] Drug Panel,* 01/17/2022 See Note          Assessment/Plan     1. Mixed hyperlipidemia  - Comprehensive Metabolic Panel; Future  - Lipid Panel; Future  - atorvastatin (LIPITOR) 20 MG tablet; TAKE ONE TABLET BY MOUTH ONCE NIGHTLY  Dispense: 90 tablet; Refill: 1    2. Primary insomnia    - traZODone (DESYREL) 50 MG tablet; Take 1 tablet by mouth nightly as needed for Sleep  Dispense: 30 tablet; Refill: 0  - clonazePAM (KLONOPIN) 1 MG tablet; TAKE ONE TABLET BY MOUTH EVERY NIGHT FOR INSOMNIA AND TAKE ONE TABLET BY MOUTH DAILY AS NEEDED FOR ANXIETY  Dispense: 45 tablet; Refill: 2    3.  Anxiety   clonazePAM (KLONOPIN) 1 MG tablet; TAKE ONE TABLET BY MOUTH EVERY NIGHT FOR INSOMNIA AND TAKE ONE TABLET BY MOUTH DAILY AS NEEDED FOR ANXIETY  Dispense: 45 tablet; Refill: 2    4. Prediabetes    - Hemoglobin A1C; Future    5. Medication management    - Drug Panel-PM-HI Res-UR Interp-A    6. Vitamin D deficiency    - vitamin D (ERGOCALCIFEROL) 1.25 MG (61968 UT) CAPS capsule; TAKE ONE CAPSULE BY MOUTH ONCE WEEKLY  Dispense: 12 capsule; Refill: 1    Discussed medications with patient, who voiced understanding of their use and indications. All questions answered. Controlled Substance Monitoring:    Acute and Chronic Pain Monitoring:   RX Monitoring 8/25/2022   Attestation -   Periodic Controlled Substance Monitoring No signs of potential drug abuse or diversion identified. Return in about 3 months (around 11/25/2022) for anxiety and insomnia.

## 2022-08-26 LAB
ESTIMATED AVERAGE GLUCOSE: 122.6 MG/DL
HBA1C MFR BLD: 5.9 %

## 2022-08-30 LAB
6-ACETYLMORPHINE: NOT DETECTED
7-AMINOCLONAZEPAM: PRESENT
ALPHA-OH-ALPRAZOLAM: NOT DETECTED
ALPHA-OH-MIDAZOLAM, URINE: NOT DETECTED
ALPRAZOLAM: NOT DETECTED
AMPHETAMINE: NOT DETECTED
BARBITURATES: NOT DETECTED
BENZOYLECGONINE: NOT DETECTED
BUPRENORPHINE: NOT DETECTED
CARISOPRODOL: NOT DETECTED
CLONAZEPAM: NOT DETECTED
CODEINE: NOT DETECTED
CREATININE URINE: 82.1 MG/DL (ref 20–400)
DIAZEPAM: NOT DETECTED
DRUGS EXPECTED: NORMAL
EER PAIN MGT DRUG PANEL, HIGH RES/EMIT U: NORMAL
ETHYL GLUCURONIDE: NOT DETECTED
FENTANYL: NOT DETECTED
GABAPENTIN: NOT DETECTED
HYDROCODONE: NOT DETECTED
HYDROMORPHONE: NOT DETECTED
LORAZEPAM: NOT DETECTED
MARIJUANA METABOLITE: NOT DETECTED
MDA: NOT DETECTED
MDEA: NOT DETECTED
MDMA URINE: NOT DETECTED
MEPERIDINE: NOT DETECTED
METHADONE: NOT DETECTED
METHAMPHETAMINE: NOT DETECTED
METHYLPHENIDATE: NOT DETECTED
MIDAZOLAM: NOT DETECTED
MORPHINE: NOT DETECTED
NALOXONE: NOT DETECTED
NORBUPRENORPHINE, FREE: NOT DETECTED
NORDIAZEPAM: NOT DETECTED
NORFENTANYL: NOT DETECTED
NORHYDROCODONE, URINE: NOT DETECTED
NOROXYCODONE: NOT DETECTED
NOROXYMORPHONE, URINE: NOT DETECTED
OXAZEPAM: NOT DETECTED
OXYCODONE: NOT DETECTED
OXYMORPHONE: NOT DETECTED
PAIN MANAGEMENT DRUG PANEL: NORMAL
PAIN MANAGEMENT DRUG PANEL: NORMAL
PCP: NOT DETECTED
PHENTERMINE: NOT DETECTED
PREGABALIN: NOT DETECTED
TAPENTADOL, URINE: NOT DETECTED
TAPENTADOL-O-SULFATE, URINE: NOT DETECTED
TEMAZEPAM: NOT DETECTED
TRAMADOL: NOT DETECTED
ZOLPIDEM: NOT DETECTED

## 2022-08-31 ASSESSMENT — ENCOUNTER SYMPTOMS
CHEST TIGHTNESS: 0
WHEEZING: 0
COUGH: 0
SORE THROAT: 0
CONSTIPATION: 0
ABDOMINAL PAIN: 0
DIARRHEA: 0
SINUS PRESSURE: 0
RHINORRHEA: 0
NAUSEA: 0
SHORTNESS OF BREATH: 0
VOMITING: 0
BLOOD IN STOOL: 0

## 2022-10-02 DIAGNOSIS — F51.01 PRIMARY INSOMNIA: ICD-10-CM

## 2022-10-03 RX ORDER — TRAZODONE HYDROCHLORIDE 50 MG/1
TABLET ORAL
Qty: 30 TABLET | Refills: 2 | Status: SHIPPED | OUTPATIENT
Start: 2022-10-03

## 2022-10-03 NOTE — TELEPHONE ENCOUNTER
Medication:   Requested Prescriptions     Pending Prescriptions Disp Refills    traZODone (DESYREL) 50 MG tablet [Pharmacy Med Name: traZODone 50 MG TABLET] 30 tablet 0     Sig: TAKE ONE TABLET BY MOUTH ONCE NIGHTLY AS NEEDED FOR SLEEP     Last Filled: 8.25.22    Last appt: 8/25/2022   Next appt: Visit date not found    Last OARRS:   RX Monitoring 8/25/2022   Attestation -   Periodic Controlled Substance Monitoring No signs of potential drug abuse or diversion identified.

## 2022-10-10 ENCOUNTER — HOSPITAL ENCOUNTER (OUTPATIENT)
Dept: GENERAL RADIOLOGY | Age: 57
Discharge: HOME | End: 2022-10-10
Payer: COMMERCIAL

## 2022-10-10 ENCOUNTER — OFFICE VISIT (OUTPATIENT)
Dept: PRIMARY CARE CLINIC | Age: 57
End: 2022-10-10
Payer: COMMERCIAL

## 2022-10-10 ENCOUNTER — TELEPHONE (OUTPATIENT)
Dept: PRIMARY CARE CLINIC | Age: 57
End: 2022-10-10

## 2022-10-10 VITALS
TEMPERATURE: 98.2 F | BODY MASS INDEX: 26.31 KG/M2 | SYSTOLIC BLOOD PRESSURE: 130 MMHG | DIASTOLIC BLOOD PRESSURE: 80 MMHG | HEART RATE: 80 BPM | HEIGHT: 62 IN | WEIGHT: 143 LBS | OXYGEN SATURATION: 97 %

## 2022-10-10 DIAGNOSIS — S20.211A CONTUSION OF RIB ON RIGHT SIDE, INITIAL ENCOUNTER: ICD-10-CM

## 2022-10-10 DIAGNOSIS — S29.9XXA TRAUMATIC INJURY OF RIB: ICD-10-CM

## 2022-10-10 DIAGNOSIS — R10.9 RIGHT FLANK PAIN: ICD-10-CM

## 2022-10-10 DIAGNOSIS — S29.9XXA TRAUMATIC INJURY OF RIB: Primary | ICD-10-CM

## 2022-10-10 LAB
BILIRUBIN, POC: NORMAL
BLOOD URINE, POC: NORMAL
CLARITY, POC: CLEAR
COLOR, POC: YELLOW
GLUCOSE URINE, POC: NORMAL
KETONES, POC: NORMAL
LEUKOCYTE EST, POC: NORMAL
NITRITE, POC: NORMAL
PH, POC: 6
PROTEIN, POC: NORMAL
SPECIFIC GRAVITY, POC: 1.01
UROBILINOGEN, POC: 0.2

## 2022-10-10 PROCEDURE — 1036F TOBACCO NON-USER: CPT | Performed by: NURSE PRACTITIONER

## 2022-10-10 PROCEDURE — G8484 FLU IMMUNIZE NO ADMIN: HCPCS | Performed by: NURSE PRACTITIONER

## 2022-10-10 PROCEDURE — 81002 URINALYSIS NONAUTO W/O SCOPE: CPT | Performed by: NURSE PRACTITIONER

## 2022-10-10 PROCEDURE — 99213 OFFICE O/P EST LOW 20 MIN: CPT | Performed by: NURSE PRACTITIONER

## 2022-10-10 PROCEDURE — G8427 DOCREV CUR MEDS BY ELIG CLIN: HCPCS | Performed by: NURSE PRACTITIONER

## 2022-10-10 PROCEDURE — G8419 CALC BMI OUT NRM PARAM NOF/U: HCPCS | Performed by: NURSE PRACTITIONER

## 2022-10-10 PROCEDURE — 71101 X-RAY EXAM UNILAT RIBS/CHEST: CPT

## 2022-10-10 PROCEDURE — 3017F COLORECTAL CA SCREEN DOC REV: CPT | Performed by: NURSE PRACTITIONER

## 2022-10-10 RX ORDER — IBUPROFEN 800 MG/1
800 TABLET ORAL 3 TIMES DAILY PRN
Qty: 90 TABLET | Refills: 0 | Status: SHIPPED | OUTPATIENT
Start: 2022-10-10

## 2022-10-10 RX ORDER — TRAMADOL HYDROCHLORIDE 50 MG/1
50 TABLET ORAL EVERY 6 HOURS PRN
Qty: 28 TABLET | Refills: 0 | Status: SHIPPED | OUTPATIENT
Start: 2022-10-10 | End: 2022-10-17

## 2022-10-10 SDOH — ECONOMIC STABILITY: FOOD INSECURITY: WITHIN THE PAST 12 MONTHS, THE FOOD YOU BOUGHT JUST DIDN'T LAST AND YOU DIDN'T HAVE MONEY TO GET MORE.: NEVER TRUE

## 2022-10-10 SDOH — ECONOMIC STABILITY: FOOD INSECURITY: WITHIN THE PAST 12 MONTHS, YOU WORRIED THAT YOUR FOOD WOULD RUN OUT BEFORE YOU GOT MONEY TO BUY MORE.: NEVER TRUE

## 2022-10-10 ASSESSMENT — ENCOUNTER SYMPTOMS
NAUSEA: 0
VOMITING: 0
DIARRHEA: 0
COLOR CHANGE: 0
WHEEZING: 0
SHORTNESS OF BREATH: 0
COUGH: 0

## 2022-10-10 ASSESSMENT — PATIENT HEALTH QUESTIONNAIRE - PHQ9
SUM OF ALL RESPONSES TO PHQ QUESTIONS 1-9: 0
SUM OF ALL RESPONSES TO PHQ9 QUESTIONS 1 & 2: 0
4. FEELING TIRED OR HAVING LITTLE ENERGY: 0
5. POOR APPETITE OR OVEREATING: 0
1. LITTLE INTEREST OR PLEASURE IN DOING THINGS: 0
6. FEELING BAD ABOUT YOURSELF - OR THAT YOU ARE A FAILURE OR HAVE LET YOURSELF OR YOUR FAMILY DOWN: 0
10. IF YOU CHECKED OFF ANY PROBLEMS, HOW DIFFICULT HAVE THESE PROBLEMS MADE IT FOR YOU TO DO YOUR WORK, TAKE CARE OF THINGS AT HOME, OR GET ALONG WITH OTHER PEOPLE: 0
9. THOUGHTS THAT YOU WOULD BE BETTER OFF DEAD, OR OF HURTING YOURSELF: 0
SUM OF ALL RESPONSES TO PHQ QUESTIONS 1-9: 0
2. FEELING DOWN, DEPRESSED OR HOPELESS: 0
SUM OF ALL RESPONSES TO PHQ QUESTIONS 1-9: 0
8. MOVING OR SPEAKING SO SLOWLY THAT OTHER PEOPLE COULD HAVE NOTICED. OR THE OPPOSITE, BEING SO FIGETY OR RESTLESS THAT YOU HAVE BEEN MOVING AROUND A LOT MORE THAN USUAL: 0
SUM OF ALL RESPONSES TO PHQ QUESTIONS 1-9: 0
3. TROUBLE FALLING OR STAYING ASLEEP: 0
7. TROUBLE CONCENTRATING ON THINGS, SUCH AS READING THE NEWSPAPER OR WATCHING TELEVISION: 0

## 2022-10-10 ASSESSMENT — ANXIETY QUESTIONNAIRES
3. WORRYING TOO MUCH ABOUT DIFFERENT THINGS: 0
IF YOU CHECKED OFF ANY PROBLEMS ON THIS QUESTIONNAIRE, HOW DIFFICULT HAVE THESE PROBLEMS MADE IT FOR YOU TO DO YOUR WORK, TAKE CARE OF THINGS AT HOME, OR GET ALONG WITH OTHER PEOPLE: NOT DIFFICULT AT ALL
2. NOT BEING ABLE TO STOP OR CONTROL WORRYING: 0
5. BEING SO RESTLESS THAT IT IS HARD TO SIT STILL: 0
4. TROUBLE RELAXING: 0
7. FEELING AFRAID AS IF SOMETHING AWFUL MIGHT HAPPEN: 0
6. BECOMING EASILY ANNOYED OR IRRITABLE: 0
1. FEELING NERVOUS, ANXIOUS, OR ON EDGE: 0
GAD7 TOTAL SCORE: 0

## 2022-10-10 ASSESSMENT — SOCIAL DETERMINANTS OF HEALTH (SDOH): HOW HARD IS IT FOR YOU TO PAY FOR THE VERY BASICS LIKE FOOD, HOUSING, MEDICAL CARE, AND HEATING?: NOT HARD AT ALL

## 2022-10-10 NOTE — TELEPHONE ENCOUNTER
----- Message from India Bledsoe sent at 10/10/2022  3:29 PM EDT -----  Subject: Message to Provider    QUESTIONS  Information for Provider? pt is requesting a callback to discuss   additional details regarding her injury to her right rib. Pt has received   results. ---------------------------------------------------------------------------  --------------  [facility]  [phone]; OK to leave message on voicemail  ---------------------------------------------------------------------------  --------------  SCRIPT ANSWERS  Relationship to Patient?  Self

## 2022-10-10 NOTE — TELEPHONE ENCOUNTER
Returned patient call. No answer. Left VM. Patient has XR results. No fractures. Has rx for Ibuprofen 800mg, tramadol sent. Will try to call again tomorrow.

## 2022-10-10 NOTE — PATIENT INSTRUCTIONS
May take IBUPROFEN  800mg every 8 hours as needed. May alternate with Tylenol as needed  May add Tramadol 50mg every 6-8 hours as needed for severe pain  Continue to apply ice  Continue with deep breathing exercises.

## 2022-10-10 NOTE — TELEPHONE ENCOUNTER
----- Message from James Astudillo sent at 10/10/2022  3:29 PM EDT -----  Subject: Message to Provider    QUESTIONS  Information for Provider? pt is requesting a callback to discuss   additional details regarding her injury to her right rib. Pt has received   results. ---------------------------------------------------------------------------  --------------  Gwendolyn GONZALEZ  [phone]; OK to leave message on voicemail  ---------------------------------------------------------------------------  --------------  SCRIPT ANSWERS  Relationship to Patient?  Self

## 2022-10-10 NOTE — PROGRESS NOTES
ENCOUNTER DATE: 10/10/2022     NAME: Dontrell Barnhart   AGE: 61 y.o. GENDER: female   YOB: 1965    Patient Active Problem List   Diagnosis    Muscle spasms of neck    Laryngopharyngeal reflux    Laryngeal spasm    Shortness of breath    Hyperlipidemia    Glaucoma    Polyp of colon    Atypical chest pain    Chest pain    Insomnia    Genital herpes simplex    Right-sided low back pain without sciatica    EKG abnormality    Tenosynovitis of ankle    Essential hypertension    Vitamin D deficiency    Anemia    Temporal pain    Anxiety    Prediabetes    Syncope and collapse      No Known Allergies  Current Outpatient Medications on File Prior to Visit   Medication Sig Dispense Refill    traZODone (DESYREL) 50 MG tablet TAKE ONE TABLET BY MOUTH ONCE NIGHTLY AS NEEDED FOR SLEEP 30 tablet 2    clonazePAM (KLONOPIN) 1 MG tablet TAKE ONE TABLET BY MOUTH EVERY NIGHT FOR INSOMNIA AND TAKE ONE TABLET BY MOUTH DAILY AS NEEDED FOR ANXIETY 45 tablet 2    atorvastatin (LIPITOR) 20 MG tablet TAKE ONE TABLET BY MOUTH ONCE NIGHTLY 90 tablet 1    vitamin D (ERGOCALCIFEROL) 1.25 MG (17708 UT) CAPS capsule TAKE ONE CAPSULE BY MOUTH ONCE WEEKLY 12 capsule 1    latanoprost (XALATAN) 0.005 % ophthalmic solution Place 1 drop into both eyes nightly   0     No current facility-administered medications on file prior to visit. Social History     Tobacco Use    Smoking status: Never    Smokeless tobacco: Never   Substance Use Topics    Alcohol use:  Yes     Alcohol/week: 6.0 standard drinks     Types: 6 Glasses of wine per week      CARE TEAM  Patient Care Team:  Jia Andrade MD as PCP - General (Internal Medicine)  Jia Andrade MD as PCP - [facility] Empaneled Provider  Aime Lara MD as Consulting Physician (Gastroenterology)  Jan Cummins MD as Consulting Physician (Obstetrics & Gynecology)  Werner Carballo MD as Consulting Physician (Cardiology)    Chief Complaint   Patient presents with    Gila Tobar Noah Casanova    Friday  night    thinks  hurt  right  rib        HPI:   Patient is here for a problem visit    Had recent fall. Slipped and fell backwards. Hit the corner of platform bed on right side/back. Woke up the next am, couldn't move. Had 2 bruises on back. Can hear some grinding with certain movements. No restricted breathing. No coughing. No shortness of breath. Has been more gassy lately. Taking tylenol, but doesn't help much. Applying ice. No difficulty urinating. No hematuria. ROS:  Review of Systems   Constitutional:  Negative for chills, diaphoresis, fatigue and fever. Respiratory:  Negative for cough, shortness of breath and wheezing. Cardiovascular:  Negative for chest pain, palpitations and leg swelling. Gastrointestinal:  Negative for diarrhea, nausea and vomiting. Genitourinary:  Negative for difficulty urinating, dysuria, flank pain, hematuria, pelvic pain and urgency. Musculoskeletal:  Negative for myalgias. Right posterior rib pain   Skin:  Negative for color change and rash. Neurological:  Negative for dizziness, weakness, light-headedness and headaches. Hematological:  Negative for adenopathy. VITALS:  /80   Pulse 80   Temp 98.2 °F (36.8 °C) (Oral)   Ht 5' 1.5\" (1.562 m)   Wt 143 lb (64.9 kg)   SpO2 97%   BMI 26.58 kg/m²      PE:  Physical Exam  Vitals and nursing note reviewed. Constitutional:       General: She is not in acute distress. Appearance: Normal appearance. She is well-developed and normal weight. She is not diaphoretic. Cardiovascular:      Rate and Rhythm: Normal rate and regular rhythm. Heart sounds: Normal heart sounds. No murmur heard. No friction rub. Pulmonary:      Effort: Pulmonary effort is normal. No respiratory distress. Breath sounds: Normal breath sounds. No wheezing, rhonchi or rales. Abdominal:      General: Abdomen is flat. There is no distension. Palpations: Abdomen is soft. Musculoskeletal:        Back:       Comments: Bruising, tenderness noted   Skin:     General: Skin is warm and dry. Findings: No rash. Neurological:      Mental Status: She is alert and oriented to person, place, and time. Motor: No weakness. Gait: Gait normal.   Psychiatric:         Mood and Affect: Mood normal.         Behavior: Behavior normal.        Results for POC orders placed in visit on 10/10/22   POCT Urinalysis no Micro   Result Value Ref Range    Color, UA yellow     Clarity, UA clear     Glucose, UA POC neg     Bilirubin, UA neg     Ketones, UA neg     Spec Grav, UA 1.010     Blood, UA POC trace     pH, UA 6.0     Protein, UA POC neg     Urobilinogen, UA 0.2     Leukocytes, UA neg     Nitrite, UA neg      ASSESSMENT/PLAN:  1. Traumatic injury of rib  Check XR. Proceed pending results  Ibuprofen 800mg TID prn. May alternate with Tylenol  Add tramadol prn for severe pain. Advised not to take with benzo. States she does not take benzo daily. Also advised not to take with trazodone. Continue with ice  Handouts given. Discussed concerning symptoms to monitor for  Increase activity as tolerated. - ibuprofen (ADVIL;MOTRIN) 800 MG tablet; Take 1 tablet by mouth 3 times daily as needed for Pain  Dispense: 90 tablet; Refill: 0  - traMADol (ULTRAM) 50 MG tablet; Take 1 tablet by mouth every 6 hours as needed for Pain for up to 7 days. Intended supply: 7 days. Take lowest dose possible to manage pain  Dispense: 28 tablet; Refill: 0  - XR RIBS RIGHT INCLUDE CHEST (MIN 3 VIEWS); Future    2. Right flank pain  Trace blood on urine dip, which is pts baseline  Will monitor.   - POCT Urinalysis no Micro        Return if symptoms worsen or fail to improve.      Electronically signed by MARI Constantino CNP on 10/10/2022 at 1:02 PM

## 2022-11-05 DIAGNOSIS — S20.211A CONTUSION OF RIB ON RIGHT SIDE, INITIAL ENCOUNTER: ICD-10-CM

## 2022-11-05 DIAGNOSIS — S29.9XXA TRAUMATIC INJURY OF RIB: ICD-10-CM

## 2022-11-07 RX ORDER — IBUPROFEN 800 MG/1
TABLET ORAL
Qty: 90 TABLET | Refills: 0 | Status: SHIPPED | OUTPATIENT
Start: 2022-11-07

## 2022-11-07 NOTE — TELEPHONE ENCOUNTER
Medication:   Requested Prescriptions     Pending Prescriptions Disp Refills    ibuprofen (ADVIL;MOTRIN) 800 MG tablet [Pharmacy Med Name: IBUPROFEN 800 MG TABLET] 90 tablet 0     Sig: TAKE ONE TABLET BY MOUTH THREE TIMES A DAY AS NEEDED FOR PAIN     Last Filled:  10/10/2022    Last appt: 10/10/2022   Next appt: Visit date not found    Last OARRS:   RX Monitoring 8/25/2022   Attestation -   Periodic Controlled Substance Monitoring No signs of potential drug abuse or diversion identified.

## 2022-12-21 DIAGNOSIS — F41.9 ANXIETY: ICD-10-CM

## 2022-12-21 DIAGNOSIS — F51.01 PRIMARY INSOMNIA: ICD-10-CM

## 2022-12-22 NOTE — TELEPHONE ENCOUNTER
Medication:   Requested Prescriptions     Pending Prescriptions Disp Refills    clonazePAM (KLONOPIN) 1 MG tablet [Pharmacy Med Name: clonazePAM 1 MG TABLET] 45 tablet      Sig: TAKE ONE TABLET BY MOUTH EVERY NIGHT AT BEDTIME FOR INSOMNIA AND TAKE ONE TABLET BY MOUTH DAILY AS NEEDED FOR ANXIETY        Last Filled:      Patient Phone Number: [phone] (home)     Last appt: 10/10/2022   Next appt: Visit date not found    Last OARRS:   RX Monitoring 8/25/2022   Attestation -   Periodic Controlled Substance Monitoring No signs of potential drug abuse or diversion identified. Preferred Pharmacy:   [facility] [id number] - [facility], [address] [place] [address] [phone]  [address] [place] [address]  Phone: [phone] Fax: [phone]  Medication:   Requested Prescriptions     Pending Prescriptions Disp Refills    clonazePAM (KLONOPIN) 1 MG tablet [Pharmacy Med Name: clonazePAM 1 MG TABLET] 45 tablet      Sig: TAKE ONE TABLET BY MOUTH EVERY NIGHT AT BEDTIME FOR INSOMNIA AND TAKE ONE TABLET BY MOUTH DAILY AS NEEDED FOR ANXIETY        Last Filled:      Patient Phone Number: [phone] (home)     Last appt: 10/10/2022   Next appt: Visit date not found    Last OARRS:   RX Monitoring 8/25/2022   Attestation -   Periodic Controlled Substance Monitoring No signs of potential drug abuse or diversion identified.        Preferred Pharmacy:   [facility] [id number] - [id number], [address] [place] [address] [phone]  [address]  Phone: [phone] Fax: [phone]

## 2022-12-26 RX ORDER — CLONAZEPAM 1 MG/1
TABLET ORAL
Qty: 45 TABLET | OUTPATIENT
Start: 2022-12-26

## 2022-12-26 NOTE — TELEPHONE ENCOUNTER
Spoke with patient. Prescription refill for Klonopin denied due to need for appointment. Patient last seen on 8/25/22. Controlled Substance Monitoring:    Acute and Chronic Pain Monitoring:   RX Monitoring 12/26/2022   Attestation -   Periodic Controlled Substance Monitoring No signs of potential drug abuse or diversion identified.

## 2023-01-30 DIAGNOSIS — S20.211A CONTUSION OF RIB ON RIGHT SIDE, INITIAL ENCOUNTER: ICD-10-CM

## 2023-01-30 DIAGNOSIS — S29.9XXA TRAUMATIC INJURY OF RIB: ICD-10-CM

## 2023-01-30 RX ORDER — TRAMADOL HYDROCHLORIDE 50 MG/1
TABLET ORAL
Qty: 28 TABLET | OUTPATIENT
Start: 2023-01-30

## 2023-02-06 ENCOUNTER — TELEPHONE (OUTPATIENT)
Dept: PRIMARY CARE CLINIC | Age: 58
End: 2023-02-06

## 2023-02-06 NOTE — TELEPHONE ENCOUNTER
Patient's  called and stated that the patient had cardiac arrest last night and admitted to the     [facility] --Room no--2332    She is in the state of coma right now     wants to speak to Dr America Jordan     His call back WE--[phone]    Please review and advice    Thanks

## 2023-02-07 NOTE — TELEPHONE ENCOUNTER
Spoke with patient's  [employer]. He states patient had cardiac arrest last night. He states patient was eating popcorn choked then aspirated. He laid her on floor. He states she was staring and unresponsive had seizure and fell backwards. He states he performed CPR for 10 minutes and then the EMTs performed for another 10 minutes until she had a pulse on her own.   He states patient has continued to have seizure activity and is in a medically induced coma at [facility].